# Patient Record
Sex: MALE | Race: WHITE | NOT HISPANIC OR LATINO | Employment: UNEMPLOYED | URBAN - METROPOLITAN AREA
[De-identification: names, ages, dates, MRNs, and addresses within clinical notes are randomized per-mention and may not be internally consistent; named-entity substitution may affect disease eponyms.]

---

## 2017-07-28 ENCOUNTER — HOSPITAL ENCOUNTER (OUTPATIENT)
Dept: LAB | Facility: MEDICAL CENTER | Age: 52
End: 2017-07-28
Attending: FAMILY MEDICINE
Payer: COMMERCIAL

## 2017-07-28 LAB
25(OH)D3 SERPL-MCNC: 23 NG/ML (ref 30–100)
ALBUMIN SERPL BCP-MCNC: 4.3 G/DL (ref 3.2–4.9)
ALBUMIN/GLOB SERPL: 1.7 G/DL
ALP SERPL-CCNC: 83 U/L (ref 30–99)
ALT SERPL-CCNC: 35 U/L (ref 2–50)
ANION GAP SERPL CALC-SCNC: 9 MMOL/L (ref 0–11.9)
APPEARANCE UR: CLEAR
AST SERPL-CCNC: 16 U/L (ref 12–45)
BASOPHILS # BLD AUTO: 0.8 % (ref 0–1.8)
BASOPHILS # BLD: 0.05 K/UL (ref 0–0.12)
BILIRUB SERPL-MCNC: 0.7 MG/DL (ref 0.1–1.5)
BILIRUB UR QL STRIP.AUTO: NEGATIVE
BUN SERPL-MCNC: 19 MG/DL (ref 8–22)
CALCIUM SERPL-MCNC: 9.4 MG/DL (ref 8.5–10.5)
CHLORIDE SERPL-SCNC: 100 MMOL/L (ref 96–112)
CHOLEST SERPL-MCNC: 201 MG/DL (ref 100–199)
CO2 SERPL-SCNC: 27 MMOL/L (ref 20–33)
COLOR UR: YELLOW
CREAT SERPL-MCNC: 0.68 MG/DL (ref 0.5–1.4)
EOSINOPHIL # BLD AUTO: 0.14 K/UL (ref 0–0.51)
EOSINOPHIL NFR BLD: 2.3 % (ref 0–6.9)
ERYTHROCYTE [DISTWIDTH] IN BLOOD BY AUTOMATED COUNT: 44.5 FL (ref 35.9–50)
EST. AVERAGE GLUCOSE BLD GHB EST-MCNC: 255 MG/DL
GFR SERPL CREATININE-BSD FRML MDRD: >60 ML/MIN/1.73 M 2
GLOBULIN SER CALC-MCNC: 2.6 G/DL (ref 1.9–3.5)
GLUCOSE SERPL-MCNC: 210 MG/DL (ref 65–99)
GLUCOSE UR STRIP.AUTO-MCNC: 500 MG/DL
HBA1C MFR BLD: 10.5 % (ref 0–5.6)
HCT VFR BLD AUTO: 50.5 % (ref 42–52)
HDLC SERPL-MCNC: 30 MG/DL
HGB BLD-MCNC: 16.9 G/DL (ref 14–18)
IMM GRANULOCYTES # BLD AUTO: 0.01 K/UL (ref 0–0.11)
IMM GRANULOCYTES NFR BLD AUTO: 0.2 % (ref 0–0.9)
KETONES UR STRIP.AUTO-MCNC: 40 MG/DL
LDLC SERPL CALC-MCNC: ABNORMAL MG/DL
LEUKOCYTE ESTERASE UR QL STRIP.AUTO: NEGATIVE
LYMPHOCYTES # BLD AUTO: 1.76 K/UL (ref 1–4.8)
LYMPHOCYTES NFR BLD: 28.6 % (ref 22–41)
MCH RBC QN AUTO: 29.3 PG (ref 27–33)
MCHC RBC AUTO-ENTMCNC: 33.5 G/DL (ref 33.7–35.3)
MCV RBC AUTO: 87.5 FL (ref 81.4–97.8)
MICRO URNS: ABNORMAL
MONOCYTES # BLD AUTO: 0.62 K/UL (ref 0–0.85)
MONOCYTES NFR BLD AUTO: 10.1 % (ref 0–13.4)
NEUTROPHILS # BLD AUTO: 3.57 K/UL (ref 1.82–7.42)
NEUTROPHILS NFR BLD: 58 % (ref 44–72)
NITRITE UR QL STRIP.AUTO: NEGATIVE
NRBC # BLD AUTO: 0 K/UL
NRBC BLD AUTO-RTO: 0 /100 WBC
PH UR STRIP.AUTO: 6.5 [PH]
PLATELET # BLD AUTO: 179 K/UL (ref 164–446)
PMV BLD AUTO: 11.9 FL (ref 9–12.9)
POTASSIUM SERPL-SCNC: 3.9 MMOL/L (ref 3.6–5.5)
PROT SERPL-MCNC: 6.9 G/DL (ref 6–8.2)
PROT UR QL STRIP: NEGATIVE MG/DL
PSA SERPL-MCNC: 0.43 NG/ML (ref 0–4)
RBC # BLD AUTO: 5.77 M/UL (ref 4.7–6.1)
RBC UR QL AUTO: NEGATIVE
SODIUM SERPL-SCNC: 136 MMOL/L (ref 135–145)
SP GR UR STRIP.AUTO: 1.03
TRIGL SERPL-MCNC: 430 MG/DL (ref 0–149)
UROBILINOGEN UR STRIP.AUTO-MCNC: 0.2 MG/DL
WBC # BLD AUTO: 6.2 K/UL (ref 4.8–10.8)

## 2017-07-28 PROCEDURE — 82306 VITAMIN D 25 HYDROXY: CPT

## 2017-07-28 PROCEDURE — 81003 URINALYSIS AUTO W/O SCOPE: CPT

## 2017-07-28 PROCEDURE — 83036 HEMOGLOBIN GLYCOSYLATED A1C: CPT

## 2017-07-28 PROCEDURE — 36415 COLL VENOUS BLD VENIPUNCTURE: CPT

## 2017-07-28 PROCEDURE — 80053 COMPREHEN METABOLIC PANEL: CPT

## 2017-07-28 PROCEDURE — 85025 COMPLETE CBC W/AUTO DIFF WBC: CPT

## 2017-07-28 PROCEDURE — 84153 ASSAY OF PSA TOTAL: CPT

## 2017-07-28 PROCEDURE — 80061 LIPID PANEL: CPT

## 2017-08-22 ENCOUNTER — OFFICE VISIT (OUTPATIENT)
Dept: ENDOCRINOLOGY | Facility: MEDICAL CENTER | Age: 52
End: 2017-08-22
Payer: COMMERCIAL

## 2017-08-22 VITALS
OXYGEN SATURATION: 99 % | WEIGHT: 232 LBS | HEIGHT: 69 IN | DIASTOLIC BLOOD PRESSURE: 88 MMHG | SYSTOLIC BLOOD PRESSURE: 140 MMHG | BODY MASS INDEX: 34.36 KG/M2 | HEART RATE: 107 BPM

## 2017-08-22 DIAGNOSIS — Z79.4 ENCOUNTER FOR LONG-TERM (CURRENT) USE OF INSULIN (HCC): ICD-10-CM

## 2017-08-22 DIAGNOSIS — E11.00 UNCONTROLLED TYPE 2 DIABETES MELLITUS WITH HYPEROSMOLARITY WITHOUT COMA, UNSPECIFIED LONG TERM INSULIN USE STATUS: ICD-10-CM

## 2017-08-22 PROCEDURE — 99204 OFFICE O/P NEW MOD 45 MIN: CPT | Performed by: PHYSICIAN ASSISTANT

## 2017-08-22 RX ORDER — PIOGLITAZONEHYDROCHLORIDE 30 MG/1
30 TABLET ORAL DAILY
Qty: 30 TAB | Refills: 11 | Status: SHIPPED | OUTPATIENT
Start: 2017-08-22 | End: 2017-10-17 | Stop reason: SDUPTHER

## 2017-08-22 RX ORDER — MULTIVITAMIN WITH IRON
TABLET ORAL
COMMUNITY

## 2017-08-22 RX ORDER — CHLORAL HYDRATE 500 MG
1000 CAPSULE ORAL
COMMUNITY

## 2017-08-22 NOTE — PATIENT INSTRUCTIONS
STOP:  1.  Janumet 50/1000 BID  2,  Lantus (not on)  3.  Humalog Kwikpen (not on)  4.  Jardiance 25    START:  1.   Trulicity 0.75 one a week on 9/5/17 increase TO 1.5  2.   Synjardy 12.5/1000 one in AM one in the PM  3.   Actos 30 one a day ( at pharmacy)

## 2017-08-22 NOTE — PROGRESS NOTES
New Patient Consult Note  Referred by: Jake Constantino M.D.    Reason for consult: Diabetes Management Type 2    HPI:  Salas Cabral is a 51 y.o. old patient who is seeing us today for diabetes care.  This is a pleasant patient with diabetes and I appreciate the opportunity to participate in the care of this patient.  This is a new patient with me today.    Labs of 7/28/17  HbA1c is 10.5, GFR >60, Trig 430,   HbA1c of 12.5 on 3/2015  HbA1c of 11.0 on 6/2012    Previous patient of Fidelia    BG Diary:8/22/2017  In the AM:  Did not bring    Has been Diabetic since  Has a Glucagon pen at home: no    1. Uncontrolled type 2 diabetes mellitus with hyperosmolarity without coma, unspecified long term insulin use status (CMS-HCC)  This is a new patient on 8/22/17  He is on:  1.  Janumet 50/1000 BID  2,  Lantus (not on)  3.  Humalog Kwikpen (not on)  4.  Jardiance 25    STOP:  1.  Janumet 50/1000 BID  2,  Lantus (not on)  3.  Humalog Kwikpen (not on)  4.  Jardiance 25    START:  1.   Trulicity 0.75 one a week on 9/5/17 increase TO 1.5  2.   Synjardy 12.5/1000 one in AM one in the PM  3.   Actos 30 one a day ( at pharmacy)       2. Encounter for long-term (current) use of insulin (CMS-HCC)    Off all insulin right now      ROS:   Constitutional: No change in weight , No fatigue, No night sweats.  HEENT: No Headache.  Eyes:  No blurred vision, No visual changes.  Cardiac: No chest pain, No palpitations.  Resp: No shortness of breath, No cough,   Gastro: No nausea or vomiting, No diarrhea.  Neuro: Denies numbness or tinging in bilateral feet or hands, and no loss of sensation.  Endo: No heat or cold intolerance.  : No polyuria, No polydipsia, No chronic UTI's.  Lower extremities: No lower leg edema bilateral.  All other systems were reviewed and were negative.    Past Medical History:  Patient Active Problem List    Diagnosis Date Noted   • Encounter for long-term (current) use of insulin (CMS-HCC) 08/22/2017    • Obesity, Class II, BMI 35-39.9, with comorbidity (CMS-HCC) 12/06/2013   • Vitamin d deficiency 02/12/2013   • Diabetes mellitus type 2, uncontrolled (CMS-HCC) 05/04/2012   • Hypertension 05/04/2012   • Hyperlipidemia 05/04/2012   • ADHD (attention deficit hyperactivity disorder) 05/04/2012   • ED (erectile dysfunction) 05/04/2012   • Depression 05/04/2012   • Chronic neck pain 05/04/2012   • Migraine headache 05/04/2012       Past Surgical History:  Past Surgical History   Procedure Laterality Date   • Arthroscopy, knee       menis tear bilateral   • Tonsillectomy         Allergies:  Review of patient's allergies indicates no known allergies.    Social History:  Social History     Social History   • Marital Status:      Spouse Name: N/A   • Number of Children: N/A   • Years of Education: N/A     Occupational History   • Not on file.     Social History Main Topics   • Smoking status: Never Smoker    • Smokeless tobacco: Never Used   • Alcohol Use: Yes      Comment: occasional   • Drug Use: No      Comment: works as an    • Sexual Activity: Not on file     Other Topics Concern   • Not on file     Social History Narrative   • No narrative on file       Family History:  Family History   Problem Relation Age of Onset   • Hypertension Mother    • Cancer Mother    • Heart Attack Father 65     cad   • Cancer Father      neck tumor ?   • Diabetes Neg Hx        Medications:    Current outpatient prescriptions:   •  Magnesium 250 MG Tab, Take  by mouth., Disp: , Rfl:   •  Omega-3 Fatty Acids (FISH OIL) 1000 MG Cap capsule, Take 1,000 mg by mouth 3 times a day, with meals., Disp: , Rfl:   •  Potassium (POTASSIMIN PO), Take  by mouth., Disp: , Rfl:   •  pioglitazone (ACTOS) 30 MG Tab, Take 1 Tab by mouth every day., Disp: 30 Tab, Rfl: 11  •  rizatriptan (MAXALT) 5 MG tablet, TAKE 1 TABLET BY MOUTH ONCE AS NEEDED FOR MIGRAINE FOR 1 DOSE MAY REPEAT IN 2 HRS IF NEEDED, Disp: 10 Tab, Rfl: 1  •  duloxetine  (CYMBALTA) 60 MG CPEP, TAKE ONE CAPSULE BY MOUTH EVERY DAY, Disp: 30 Cap, Rfl: 5  •  hydrocodone-acetaminophen (VICODIN ES) 7.5-750 MG per tablet, Take 1 Tab by mouth 2 times a day as needed for Mild Pain., Disp: 60 Each, Rfl: 0  •  Blood Glucose Monitoring Suppl SUPPLIES MISC, Test strips order: Test strips for One Touch Ultra 2 meter. Sig: use 4 and prn ssx high or low sugar, Disp: 100 Each, Rfl: 3  •  glucose blood (FREESTYLE TEST STRIPS) strip, Testing 3 times a day or prn , Disp: 30 Strip, Rfl: 0  •  ibuprofen (MOTRIN) 600 MG TABS, Take 1 Tab by mouth every 8 hours as needed for Mild Pain. AS NEEDED FOR MILD PAIN, Disp: 90 Tab, Rfl: 0  •  ondansetron (ZOFRAN) 4 MG TABS, Take 1 Tab by mouth every 8 hours as needed for Nausea/Vomiting., Disp: 20 Tab, Rfl: 1  •  sildenafil citrate (VIAGRA) 100 MG tablet, Take 1 Tab by mouth as needed for Erectile Dysfunction., Disp: 10 Tab, Rfl: 5  •  azithromycin (ZITHROMAX) 250 MG TABS, 2 tabs by mouth day 1, 1 tab by mouth days 2-5, Disp: 6 Tab, Rfl: 0  •  ofloxacin otic sol (FLOXIN OTIC) 0.3 % SOLN, Place 5 Drops in right ear 2 times a day. Use for 7 days., Disp: 5 mL, Rfl: 0  •  tramadol (ULTRAM) 50 MG TABS, TAKE 1 TO 2 TABLETS BY MOUTH EVERY 8 HOURS AS NEEDED FOR MILD PAIN, Disp: 60 Tab, Rfl: 0  •  hydrocodone-acetaminophen (VICODIN) 5-500 MG TABS, TAKE 1 TABLET BY MOUTH TWICE A DAY AS NEEDED, Disp: 60 Each, Rfl: 0  •  cyclobenzaprine (FLEXERIL) 5 MG tablet, TAKE 1 TABLET BY MOUTH TWICE A DAY AS NEEDED FOR MUSCLE SPASMS, Disp: 60 Tab, Rfl: 0  •  Ramipril (ALTACE) 10 MG TABS, Take 1 Tab by mouth every day., Disp: 90 Tab, Rfl: 3  •  topiramate (TOPAMAX) 25 MG capsule, Take 1 Cap by mouth every day., Disp: 60 Cap, Rfl: 0  •  fexofenadine (ALLEGRA) 180 MG tablet, Take 1 Tab by mouth every day., Disp: 90 Tab, Rfl: 1  •  atorvastatin (LIPITOR) 20 MG TABS, Take 1 Tab by mouth every day., Disp: 90 Each, Rfl: 3      Physical Examination:   Vital signs: /88 mmHg  Pulse  "107  Ht 1.765 m (5' 9.49\")  Wt 105.235 kg (232 lb)  BMI 33.78 kg/m2  SpO2 99%  General: No distress, cooperative, well dressed and well nourished.   Eyes: No scleral icterus or discharge, No hyposphagma  ENMT: Normal on external inspection of nose, lips, No nasal drainage   Neck: No abnormal masses on inspection  Resp: Normal effort, Bilateral clear to auscultation, No wheezing, No rales  CVS: Regular rate and rhythm, S1 S2 normal, No murmur. No gallop  Extremities: No edema bilateral extremities  Neuro: Alert and oriented  Skin: No rash, No Ulcers  Psych: Normal mood and affect    Assessment and Plan:    1. Uncontrolled type 2 diabetes mellitus with hyperosmolarity without coma, unspecified long term insulin use status (CMS-HCC)  This is a new patient on 8/22/17  He is on:  1.  Janumet 50/1000 BID  2,  Lantus (not on)  3.  Humalog Kwikpen (not on)  4.  Jardiance 25    STOP:  1.  Janumet 50/1000 BID  2,  Lantus (not on)  3.  Humalog Kwikpen (not on)  4.  Jardiance 25    START:  1.   Trulicity 0.75 one a week on 9/5/17 increase TO 1.5  2.   Synjardy 12.5/1000 one in AM one in the PM  3.   Actos 30 one a day       2. Encounter for long-term (current) use of insulin (CMS-HCC)  Discussed food at length today      Return in about 2 weeks (around 9/5/2017).    Blood glucose log: Check BG in the morning when wake up, before lunch or dinner and before bed.  So three times a day.  Always bring BG diary to the next office visit.     This patient during there office visit was started on new medication Trulicity and Actos.  Side effects of new medications were discussed with the patient today in the office. The patient was supplied paperwork on this new medication.    Thank you kindly for allowing me to participate in the diabetes care plan for this patient.    Pk Mendoza PA-C, BC-ADM  Board Certified - [employer]  08/22/2017    CC:   Jake Constantino M.D.      "

## 2017-08-22 NOTE — MR AVS SNAPSHOT
"        Salas Cabral   2017 11:30 AM   Office Visit   MRN: [id number]    Department:  Endocrinology [facility]   Dept Phone:  [phone]    Description:  Male : 1965   Provider:  Jackson Mendoza PA-C           Reason for Visit     New Patient           Allergies as of 2017     No Known Allergies      You were diagnosed with     Uncontrolled type 2 diabetes mellitus with hyperosmolarity without coma, unspecified long term insulin use status (CMS-HCC)   [5128798]       Encounter for long-term (current) use of insulin (CMS-HCC)   [V58.67.ICD-9-CM]         Vital Signs     Blood Pressure Pulse Height Weight Body Mass Index Oxygen Saturation    140/88 mmHg 107 1.765 m (5' 9.49\") 105.235 kg (232 lb) 33.78 kg/m2 99%    Smoking Status                   Never Smoker            Basic Information     Date Of Birth Sex Race Ethnicity Preferred Language    1965 Male White Non- English      Your appointments     Sep 06, 2017 11:50 AM   Established Patient with Jcakson Mendoza PA-C   [facility]    [address], [address]  [facility] [address] [phone]           You will be receiving a confirmation call a few days before your appointment from our automated call confirmation system.              Problem List              ICD-10-CM Priority Class Noted - Resolved    Diabetes mellitus type 2, uncontrolled (CMS-HCC) E11.65   2012 - Present    Hypertension (Chronic) I10   2012 - Present    Hyperlipidemia (Chronic) E78.5   2012 - Present    ADHD (attention deficit hyperactivity disorder) F90.9   2012 - Present    ED (erectile dysfunction) N52.9   2012 - Present    Depression F32.9   2012 - Present    Chronic neck pain M54.2, G89.29   2012 - Present    Migraine headache G43.909   2012 - Present    Vitamin d deficiency    2013 - Present    Obesity, Class II, BMI 35-39.9, with comorbidity (CMS-HCC) E66.9   " 12/6/2013 - Present    Encounter for long-term (current) use of insulin (CMS-[facility]) Z79.4   8/22/2017 - Present      Health Maintenance        Date Due Completion Dates    IMM HEP B VACCINE (1 of 3 - Primary Series) 1965 ---    IMM PNEUMOCOCCAL 19-64 (ADULT) MEDIUM RISK SERIES (1 of 1 - PPSV23) 1/31/2014 12/6/2013    RETINAL SCREENING 11/6/2014 11/6/2013 (Prv Comp)    Override on 11/6/2013: Previously completed (Peri Leger)    URINE ACR / MICROALBUMIN 11/27/2014 11/27/2013, 5/31/2012    DIABETES MONOFILAMENT / LE EXAM 12/6/2014 12/6/2013    COLONOSCOPY 11/20/2016 ---    IMM INFLUENZA (1) 9/1/2017 12/6/2013    A1C SCREENING 1/28/2018 7/28/2017, 12/21/2015, 3/27/2015, 11/27/2013, 1/30/2013, 10/9/2012, 5/31/2012    FASTING LIPID PROFILE 7/28/2018 7/28/2017, 12/21/2015, 3/27/2015, 11/27/2013, 1/31/2013, 10/9/2012, 5/31/2012    SERUM CREATININE 7/28/2018 7/28/2017, 12/21/2015, 3/27/2015, 11/27/2013, 1/30/2013, 10/9/2012, 5/31/2012    IMM DTaP/Tdap/Td Vaccine (2 - Td) 12/6/2023 12/6/2013            Current Immunizations     13-VALENT PCV PREVNAR 12/6/2013    Influenza TIV (IM) 12/6/2013    Tdap Vaccine 12/6/2013      Below and/or attached are the medications your provider expects you to take. Review all of your home medications and newly ordered medications with your provider and/or pharmacist. Follow medication instructions as directed by your provider and/or pharmacist. Please keep your medication list with you and share with your provider. Update the information when medications are discontinued, doses are changed, or new medications (including over-the-counter products) are added; and carry medication information at all times in the event of emergency situations     Allergies:  No Known Allergies          Medications  Valid as of: August 22, 2017 - 11:48 AM    Generic Name Brand Name Tablet Size Instructions for use    Atorvastatin Calcium (Tab) LIPITOR 20 MG Take 1 Tab by mouth every day.        Azithromycin  (Tab) ZITHROMAX 250 MG 2 tabs by mouth day 1, 1 tab by mouth days 2-5        Blood Glucose Monitoring Suppl (Misc) Blood Glucose Monitoring Suppl Supplies Test strips order: Test strips for One Touch Ultra 2 meter. Sig: use 4 and prn ssx high or low sugar        Cyclobenzaprine HCl (Tab) FLEXERIL 5 MG TAKE 1 TABLET BY MOUTH TWICE A DAY AS NEEDED FOR MUSCLE SPASMS        DULoxetine HCl (Cap DR Particles) CYMBALTA 60 MG TAKE ONE CAPSULE BY MOUTH EVERY DAY        Fexofenadine HCl (Tab) ALLEGRA 180 MG Take 1 Tab by mouth every day.        Glucose Blood (Strip) glucose blood  Testing 3 times a day or prn          Hydrocodone-Acetaminophen (Tab) VICODIN ES 7.5-750 MG Take 1 Tab by mouth 2 times a day as needed for Mild Pain.        Hydrocodone-Acetaminophen (Tab) VICODIN 5-500 MG TAKE 1 TABLET BY MOUTH TWICE A DAY AS NEEDED        Ibuprofen (Tab) MOTRIN 600 MG Take 1 Tab by mouth every 8 hours as needed for Mild Pain. AS NEEDED FOR MILD PAIN        Magnesium (Tab) Magnesium 250 MG Take  by mouth.        Ofloxacin (Solution) FLOXIN OTIC 0.3 % Place 5 Drops in right ear 2 times a day. Use for 7 days.        Omega-3 Fatty Acids (Cap) fish oil 1000 MG Take 1,000 mg by mouth 3 times a day, with meals.        Ondansetron HCl (Tab) ZOFRAN 4 MG Take 1 Tab by mouth every 8 hours as needed for Nausea/Vomiting.        Pioglitazone HCl (Tab) ACTOS 30 MG Take 1 Tab by mouth every day.        Potassium   Take  by mouth.        Ramipril (Tab) Ramipril 10 MG Take 1 Tab by mouth every day.        Rizatriptan Benzoate (Tab) MAXALT 5 MG TAKE 1 TABLET BY MOUTH ONCE AS NEEDED FOR MIGRAINE FOR 1 DOSE MAY REPEAT IN 2 HRS IF NEEDED        Sildenafil Citrate (Tab) VIAGRA 100 MG Take 1 Tab by mouth as needed for Erectile Dysfunction.        Topiramate (CAPSULE SPRINKLE) TOPAMAX 25 MG Take 1 Cap by mouth every day.        TraMADol HCl (Tab) ULTRAM 50 MG TAKE 1 TO 2 TABLETS BY MOUTH EVERY 8 HOURS AS NEEDED FOR MILD PAIN        .                  Medicines prescribed today were sent to:     [id number] - [place], [place] - [address] [place] [address]    [address] [place] [facility] [place] [address]    Phone: [phone] Fax: [phone]    Open 24 Hours?: No      Medication refill instructions:       If your prescription bottle indicates you have medication refills left, it is not necessary to call your provider’s office. Please contact your pharmacy and they will refill your medication.    If your prescription bottle indicates you do not have any refills left, you may request refills at any time through one of the following ways: The online [employer] system (except Urgent Care), by calling your provider’s office, or by asking your pharmacy to contact your provider’s office with a refill request. Medication refills are processed only during regular business hours and may not be available until the next business day. Your provider may request additional information or to have a follow-up visit with you prior to refilling your medication.   *Please Note: Medication refills are assigned a new Rx number when refilled electronically. Your pharmacy may indicate that no refills were authorized even though a new prescription for the same medication is available at the pharmacy. Please request the medicine by name with the pharmacy before contacting your provider for a refill.        Instructions    STOP:  1.  Janumet 50/1000 BID  2,  Lantus (not on)  3.  Humalog Kwikpen (not on)  4.  Jardiance 25    START:  1.   Trulicity 0.75 one a week on 9/5/17 increase TO 1.5  2.   Synjardy 12.5/1000 one in AM one in the PM  3.   Actos 30 one a day ( at pharmacy)                 [employer] Access Code: Activation code not generated  Current [employer] Status: Active

## 2017-09-06 ENCOUNTER — OFFICE VISIT (OUTPATIENT)
Dept: ENDOCRINOLOGY | Facility: MEDICAL CENTER | Age: 52
End: 2017-09-06
Payer: COMMERCIAL

## 2017-09-06 VITALS
OXYGEN SATURATION: 93 % | HEART RATE: 106 BPM | BODY MASS INDEX: 33.21 KG/M2 | DIASTOLIC BLOOD PRESSURE: 82 MMHG | HEIGHT: 70 IN | SYSTOLIC BLOOD PRESSURE: 126 MMHG | WEIGHT: 232 LBS

## 2017-09-06 DIAGNOSIS — E11.00 UNCONTROLLED TYPE 2 DIABETES MELLITUS WITH HYPEROSMOLARITY WITHOUT COMA, WITHOUT LONG-TERM CURRENT USE OF INSULIN (HCC): ICD-10-CM

## 2017-09-06 DIAGNOSIS — Z79.4 ENCOUNTER FOR LONG-TERM (CURRENT) USE OF INSULIN (HCC): ICD-10-CM

## 2017-09-06 PROCEDURE — 99214 OFFICE O/P EST MOD 30 MIN: CPT | Performed by: PHYSICIAN ASSISTANT

## 2017-09-06 NOTE — PROGRESS NOTES
Return to office Patient Consult Note  Referred by: Jake Constantino M.D.    Reason for consult: Diabetes Management Type 2    HPI:  Salas Cabral is a 51 y.o. old patient who is seeing us today for diabetes care.  This is a pleasant patient with diabetes and I appreciate the opportunity to participate in the care of this patient.    BG Diary:9/6/2017  In the AM: 118, 139, 154, 156, 140  Before meal: 117, 102, 156   Before Bed: 143, 113, 122, 126     Labs of 7/28/17  HbA1c is 10.5, GFR >60, Trig 430,   HbA1c of 12.5 on 3/2015  HbA1c of 11.0 on 6/2012     Previous patient of Fidelia     BG Diary:8/22/2017  In the AM:  Did not bring      1. Uncontrolled type 2 diabetes mellitus with hyperosmolarity without coma, without long-term current use of insulin (CMS-HCC)  This is a new patient on 8/22/17  He was on:  1.  Janumet 50/1000 BID  2,  Lantus (not on)  3.  Humalog Kwikpen (not on)  4.  Jardiance 25     I had him STOP:  1.  Janumet 50/1000 BID  2,  Lantus (not on)  3.  Humalog Kwikpen (not on)  4.  Jardiance 25     START:  1.   Trulicity 1.5 weekly  2.   Synjardy 12.5/1000 one in AM one in the PM  3.   Actos 30 one a day     2. Encounter for long-term (current) use of insulin (CMS-HCC)  Now off all insulin         ROS:   Constitutional: No night sweats.  Eyes:  No visual changes.  Cardiac: No chest pain, No palpitations or racing heart rate.  Resp: No shortness of breath, No cough,   Gi: No Diarrhea    All other systems were reviewed and were/are negative.  The ROS was revised/revisited during this office visit from the patients first office visit with me on 8/22/17  Please review the full ROS during the first office visit.    Past Medical History:  Patient Active Problem List    Diagnosis Date Noted   • Encounter for long-term (current) use of insulin (CMS-HCC) 08/22/2017   • Obesity, Class II, BMI 35-39.9, with comorbidity (CMS-[facility]) 12/06/2013   • Vitamin d deficiency 02/12/2013   • Diabetes mellitus type 2,  uncontrolled (CMS-HCC) 05/04/2012   • Hypertension 05/04/2012   • Hyperlipidemia 05/04/2012   • ADHD (attention deficit hyperactivity disorder) 05/04/2012   • ED (erectile dysfunction) 05/04/2012   • Depression 05/04/2012   • Chronic neck pain 05/04/2012   • Migraine headache 05/04/2012       Past Surgical History:  Past Surgical History:   Procedure Laterality Date   • ARTHROSCOPY, KNEE      menis tear bilateral   • TONSILLECTOMY         Allergies:  Review of patient's allergies indicates no known allergies.    Social History:  Social History     Social History   • Marital status:      Spouse name: N/A   • Number of children: N/A   • Years of education: N/A     Occupational History   • Not on file.     Social History Main Topics   • Smoking status: Never Smoker   • Smokeless tobacco: Never Used   • Alcohol use Yes      Comment: occasional   • Drug use: No      Comment: works as an    • Sexual activity: Not on file     Other Topics Concern   • Not on file     Social History Narrative   • No narrative on file       Family History:  Family History   Problem Relation Age of Onset   • Hypertension Mother    • Cancer Mother    • Heart Attack Father 65     cad   • Cancer Father      neck tumor ?   • Diabetes Neg Hx        Medications:    Current Outpatient Prescriptions:   •  Empagliflozin-Metformin HCl ER 12.5-1000 MG TABLET SR 24 HR, Take 1 Tab by mouth 2 Times a Day., Disp: 60 Tab, Rfl: 11  •  Dulaglutide (TRULICITY) 1.5 MG/0.5ML Solution Pen-injector, Inject 0.5 mL as instructed every 7 days., Disp: 4 PEN, Rfl: 6  •  Magnesium 250 MG Tab, Take  by mouth., Disp: , Rfl:   •  Omega-3 Fatty Acids (FISH OIL) 1000 MG Cap capsule, Take 1,000 mg by mouth 3 times a day, with meals., Disp: , Rfl:   •  Potassium (POTASSIMIN PO), Take  by mouth., Disp: , Rfl:   •  pioglitazone (ACTOS) 30 MG Tab, Take 1 Tab by mouth every day., Disp: 30 Tab, Rfl: 11  •  duloxetine (CYMBALTA) 60 MG CPEP, TAKE ONE CAPSULE BY MOUTH  EVERY DAY, Disp: 30 Cap, Rfl: 5  •  Blood Glucose Monitoring Suppl SUPPLIES MISC, Test strips order: Test strips for One Touch Ultra 2 meter. Sig: use 4 and prn ssx high or low sugar, Disp: 100 Each, Rfl: 3  •  glucose blood (FREESTYLE TEST STRIPS) strip, Testing 3 times a day or prn , Disp: 30 Strip, Rfl: 0  •  rizatriptan (MAXALT) 5 MG tablet, TAKE 1 TABLET BY MOUTH ONCE AS NEEDED FOR MIGRAINE FOR 1 DOSE MAY REPEAT IN 2 HRS IF NEEDED, Disp: 10 Tab, Rfl: 1  •  azithromycin (ZITHROMAX) 250 MG TABS, 2 tabs by mouth day 1, 1 tab by mouth days 2-5, Disp: 6 Tab, Rfl: 0  •  ofloxacin otic sol (FLOXIN OTIC) 0.3 % SOLN, Place 5 Drops in right ear 2 times a day. Use for 7 days., Disp: 5 mL, Rfl: 0  •  tramadol (ULTRAM) 50 MG TABS, TAKE 1 TO 2 TABLETS BY MOUTH EVERY 8 HOURS AS NEEDED FOR MILD PAIN, Disp: 60 Tab, Rfl: 0  •  hydrocodone-acetaminophen (VICODIN) 5-500 MG TABS, TAKE 1 TABLET BY MOUTH TWICE A DAY AS NEEDED, Disp: 60 Each, Rfl: 0  •  cyclobenzaprine (FLEXERIL) 5 MG tablet, TAKE 1 TABLET BY MOUTH TWICE A DAY AS NEEDED FOR MUSCLE SPASMS, Disp: 60 Tab, Rfl: 0  •  hydrocodone-acetaminophen (VICODIN ES) 7.5-750 MG per tablet, Take 1 Tab by mouth 2 times a day as needed for Mild Pain., Disp: 60 Each, Rfl: 0  •  Ramipril (ALTACE) 10 MG TABS, Take 1 Tab by mouth every day., Disp: 90 Tab, Rfl: 3  •  topiramate (TOPAMAX) 25 MG capsule, Take 1 Cap by mouth every day., Disp: 60 Cap, Rfl: 0  •  ibuprofen (MOTRIN) 600 MG TABS, Take 1 Tab by mouth every 8 hours as needed for Mild Pain. AS NEEDED FOR MILD PAIN, Disp: 90 Tab, Rfl: 0  •  ondansetron (ZOFRAN) 4 MG TABS, Take 1 Tab by mouth every 8 hours as needed for Nausea/Vomiting., Disp: 20 Tab, Rfl: 1  •  fexofenadine (ALLEGRA) 180 MG tablet, Take 1 Tab by mouth every day., Disp: 90 Tab, Rfl: 1  •  atorvastatin (LIPITOR) 20 MG TABS, Take 1 Tab by mouth every day., Disp: 90 Each, Rfl: 3  •  sildenafil citrate (VIAGRA) 100 MG tablet, Take 1 Tab by mouth as needed for  "Erectile Dysfunction., Disp: 10 Tab, Rfl: 5        Physical Examination:   Vital signs: /82   Pulse (!) 106   Ht 1.765 m (5' 9.5\")   Wt 105.2 kg (232 lb)   SpO2 93%   BMI 33.77 kg/m²   General: No distress, cooperative, well dressed and well nourished.   Eyes: No scleral icterus or discharge, No hyposphagma  ENMT: Normal on external inspection of nose, lips, No nasal drainage   Neck: No abnormal masses on inspection  Resp: Normal effort, Bilateral clear to auscultation, No wheezing, No rales  CVS: Regular rate and rhythm, S1 S2 normal, No murmur. No gallop  Extremities: No edema bilateral extremities  Neuro: Alert and oriented  Skin: No rash, No Ulcers  Psych: Normal mood and affect      Assessment and Plan:    1. Uncontrolled type 2 diabetes mellitus with hyperosmolarity without coma, without long-term current use of insulin (CMS-[facility])    Now on:  1.   Trulicity 1.5 each week  2.   Synjardy 12.5/1000 one in AM one in the PM  3.   Actos 30 one a day  (if numbers come back in 2 weeks well I will stop Actos)      2. Encounter for long-term (current) use of insulin (CMS-[facility])  Now off all insulin and he states his numbers have never looked so good      Return in about 2 weeks (around 9/20/2017).    Blood glucose log: Check BG in the morning when wake up, before lunch or dinner and before bed.  So three times a day.  Always bring BG diary to the next office visit.     Thank you kindly for allowing me to participate in the diabetes care plan for this patient.    Pk Mendoza PA-C, BC-ADM  Board Certified - Advanced Diabetes Management  09/06/17    CC:   Jake Constantino M.D.    "

## 2017-09-20 ENCOUNTER — APPOINTMENT (OUTPATIENT)
Dept: ENDOCRINOLOGY | Facility: MEDICAL CENTER | Age: 52
End: 2017-09-20
Payer: COMMERCIAL

## 2017-09-29 ENCOUNTER — OFFICE VISIT (OUTPATIENT)
Dept: ENDOCRINOLOGY | Facility: MEDICAL CENTER | Age: 52
End: 2017-09-29
Payer: COMMERCIAL

## 2017-09-29 VITALS
BODY MASS INDEX: 32.21 KG/M2 | OXYGEN SATURATION: 94 % | HEIGHT: 70 IN | SYSTOLIC BLOOD PRESSURE: 134 MMHG | WEIGHT: 225 LBS | HEART RATE: 137 BPM | DIASTOLIC BLOOD PRESSURE: 86 MMHG

## 2017-09-29 DIAGNOSIS — E11.00 UNCONTROLLED TYPE 2 DIABETES MELLITUS WITH HYPEROSMOLARITY WITHOUT COMA, WITHOUT LONG-TERM CURRENT USE OF INSULIN (HCC): ICD-10-CM

## 2017-09-29 DIAGNOSIS — Z79.4 ENCOUNTER FOR LONG-TERM (CURRENT) USE OF INSULIN (HCC): ICD-10-CM

## 2017-09-29 PROCEDURE — 99213 OFFICE O/P EST LOW 20 MIN: CPT | Performed by: PHYSICIAN ASSISTANT

## 2017-09-29 NOTE — PROGRESS NOTES
Return to office Patient Consult Note  Referred by: Jake Constantino M.D.    Reason for consult: Diabetes Management Type 2    HPI:  Salas Cabral is a 51 y.o. old patient who is seeing us today for diabetes care.  This is a pleasant patient with diabetes and I appreciate the opportunity to participate in the care of this patient.    BG Diary:9/29/2017  In the AM: 169, 147, 150, 180, 163, 138, 154, 234  Before meal:  Before Bed: 140, 160, 172, 140, 120, 268     BG Diary:9/6/2017  In the AM: 118, 139, 154, 156, 140  Before meal: 117, 102, 156   Before Bed: 143, 113, 122, 126      Labs of 7/28/17  HbA1c is 10.5, GFR >60, Trig 430,   HbA1c of 12.5 on 3/2015  HbA1c of 11.0 on 6/2012     Previous patient of Fidelia     BG Diary:8/22/2017  In the AM:  Did not bring         1. Uncontrolled type 2 diabetes mellitus with hyperosmolarity without coma, without long-term current use of insulin (CMS-[facility])  This is a new patient on 8/22/17  He was on:  1.  Janumet 50/1000 BID  2,  Lantus (not on)  3.  Humalog Kwikpen (not on)  4.  Jardiance 25     I had him STOP:  1.  Janumet 50/1000 BID  2,  Lantus (not on)  3.  Humalog Kwikpen (not on)  4.  Jardiance 25     Now on:  1.   Trulicity 1.5 each week (Sunday)  2.   Synjardy 12.5/1000 one in AM one in the PM  3.   Actos 30 one a day  (if numbers come back in 2 weeks well I will stop Actos)    2. Encounter for long-term (current) use of insulin (CMS-[facility])  Now off all insulin      ROS:   Constitutional: No night sweats.  Eyes:  No visual changes.  Cardiac: No chest pain, No palpitations or racing heart rate.  Resp: No shortness of breath, No cough,   Gi: No Diarrhea    All other systems were reviewed and were/are negative.  The ROS was revised/revisited during this office visit from the patients first office visit with me on 8/22/17  Please review the full ROS during the first office visit.    Past Medical History:  Patient Active Problem List    Diagnosis Date Noted   •  Encounter for long-term (current) use of insulin (CMS-HCC) 08/22/2017   • Obesity, Class II, BMI 35-39.9, with comorbidity 12/06/2013   • Vitamin d deficiency 02/12/2013   • Diabetes mellitus type 2, uncontrolled (CMS-HCC) 05/04/2012   • Hypertension 05/04/2012   • Hyperlipidemia 05/04/2012   • ADHD (attention deficit hyperactivity disorder) 05/04/2012   • ED (erectile dysfunction) 05/04/2012   • Depression 05/04/2012   • Chronic neck pain 05/04/2012   • Migraine headache 05/04/2012       Past Surgical History:  Past Surgical History:   Procedure Laterality Date   • ARTHROSCOPY, KNEE      menis tear bilateral   • TONSILLECTOMY         Allergies:  Review of patient's allergies indicates no known allergies.    Social History:  Social History     Social History   • Marital status:      Spouse name: N/A   • Number of children: N/A   • Years of education: N/A     Occupational History   • Not on file.     Social History Main Topics   • Smoking status: Never Smoker   • Smokeless tobacco: Never Used   • Alcohol use Yes      Comment: occasional   • Drug use: No      Comment: works as an    • Sexual activity: Not on file     Other Topics Concern   • Not on file     Social History Narrative   • No narrative on file       Family History:  Family History   Problem Relation Age of Onset   • Hypertension Mother    • Cancer Mother    • Heart Attack Father 65     cad   • Cancer Father      neck tumor ?   • Diabetes Neg Hx        Medications:    Current Outpatient Prescriptions:   •  Dulaglutide (TRULICITY) 1.5 MG/0.5ML Solution Pen-injector, Inject 0.5 mL as instructed every 7 days., Disp: 4 PEN, Rfl: 6  •  Empagliflozin-Metformin HCl ER 12.5-1000 MG TABLET SR 24 HR, Take 1 Tab by mouth 2 Times a Day., Disp: 60 Tab, Rfl: 11  •  Magnesium 250 MG Tab, Take  by mouth., Disp: , Rfl:   •  Omega-3 Fatty Acids (FISH OIL) 1000 MG Cap capsule, Take 1,000 mg by mouth 3 times a day, with meals., Disp: , Rfl:   •  Potassium  (POTASSIMIN PO), Take  by mouth., Disp: , Rfl:   •  pioglitazone (ACTOS) 30 MG Tab, Take 1 Tab by mouth every day., Disp: 30 Tab, Rfl: 11  •  Blood Glucose Monitoring Suppl SUPPLIES MISC, Test strips order: Test strips for One Touch Ultra 2 meter. Sig: use 4 and prn ssx high or low sugar, Disp: 100 Each, Rfl: 3  •  glucose blood (FREESTYLE TEST STRIPS) strip, Testing 3 times a day or prn , Disp: 30 Strip, Rfl: 0  •  rizatriptan (MAXALT) 5 MG tablet, TAKE 1 TABLET BY MOUTH ONCE AS NEEDED FOR MIGRAINE FOR 1 DOSE MAY REPEAT IN 2 HRS IF NEEDED, Disp: 10 Tab, Rfl: 1  •  azithromycin (ZITHROMAX) 250 MG TABS, 2 tabs by mouth day 1, 1 tab by mouth days 2-5, Disp: 6 Tab, Rfl: 0  •  ofloxacin otic sol (FLOXIN OTIC) 0.3 % SOLN, Place 5 Drops in right ear 2 times a day. Use for 7 days., Disp: 5 mL, Rfl: 0  •  tramadol (ULTRAM) 50 MG TABS, TAKE 1 TO 2 TABLETS BY MOUTH EVERY 8 HOURS AS NEEDED FOR MILD PAIN, Disp: 60 Tab, Rfl: 0  •  duloxetine (CYMBALTA) 60 MG CPEP, TAKE ONE CAPSULE BY MOUTH EVERY DAY, Disp: 30 Cap, Rfl: 5  •  hydrocodone-acetaminophen (VICODIN) 5-500 MG TABS, TAKE 1 TABLET BY MOUTH TWICE A DAY AS NEEDED, Disp: 60 Each, Rfl: 0  •  cyclobenzaprine (FLEXERIL) 5 MG tablet, TAKE 1 TABLET BY MOUTH TWICE A DAY AS NEEDED FOR MUSCLE SPASMS, Disp: 60 Tab, Rfl: 0  •  hydrocodone-acetaminophen (VICODIN ES) 7.5-750 MG per tablet, Take 1 Tab by mouth 2 times a day as needed for Mild Pain., Disp: 60 Each, Rfl: 0  •  Ramipril (ALTACE) 10 MG TABS, Take 1 Tab by mouth every day., Disp: 90 Tab, Rfl: 3  •  topiramate (TOPAMAX) 25 MG capsule, Take 1 Cap by mouth every day., Disp: 60 Cap, Rfl: 0  •  ibuprofen (MOTRIN) 600 MG TABS, Take 1 Tab by mouth every 8 hours as needed for Mild Pain. AS NEEDED FOR MILD PAIN, Disp: 90 Tab, Rfl: 0  •  ondansetron (ZOFRAN) 4 MG TABS, Take 1 Tab by mouth every 8 hours as needed for Nausea/Vomiting., Disp: 20 Tab, Rfl: 1  •  fexofenadine (ALLEGRA) 180 MG tablet, Take 1 Tab by mouth every day.,  "Disp: 90 Tab, Rfl: 1  •  atorvastatin (LIPITOR) 20 MG TABS, Take 1 Tab by mouth every day., Disp: 90 Each, Rfl: 3  •  sildenafil citrate (VIAGRA) 100 MG tablet, Take 1 Tab by mouth as needed for Erectile Dysfunction., Disp: 10 Tab, Rfl: 5        Physical Examination:   Vital signs: /86   Pulse (!) 137   Ht 1.765 m (5' 9.5\")   Wt 102.1 kg (225 lb)   SpO2 94%   BMI 32.75 kg/m²   General: No distress, cooperative, well dressed and well nourished.   Eyes: No scleral icterus or discharge, No hyposphagma  ENMT: Normal on external inspection of nose, lips, No nasal drainage   Neck: No abnormal masses on inspection  Resp: Normal effort, Bilateral clear to auscultation, No wheezing, No rales  CVS: Regular rate and rhythm, S1 S2 normal, No murmur. No gallop  Extremities: No edema bilateral extremities  Neuro: Alert and oriented  Skin: No rash, No Ulcers  Psych: Normal mood and affect      Assessment and Plan:    1. Uncontrolled type 2 diabetes mellitus with hyperosmolarity without coma, without long-term current use of insulin (CMS-[facility])    Now on:  1.   Trulicity 1.5 each week  2.   Synjardy 12.5/1000 one in AM one in the PM  3.   Actos 30 one a day       2. Encounter for long-term (current) use of insulin (CMS-HCC)  Off insulin for now      Return in about 1 month (around 10/29/2017).    Blood glucose log: Check BG in the morning when wake up, before lunch or dinner and before bed.  So three times a day.  Always bring BG diary to the next office visit.     Thank you kindly for allowing me to participate in the diabetes care plan for this patient.    Pk Mendoza PA-C, BC-ADM  Board Certified - Advanced Diabetes Management  09/29/17    CC:   Jake Constantino M.D.    "

## 2017-10-09 NOTE — TELEPHONE ENCOUNTER
Called insurance. Per insurance for next month [facility] pt will need a 90 day supply in order for it to be covered.

## 2017-10-10 ENCOUNTER — TELEPHONE (OUTPATIENT)
Dept: ENDOCRINOLOGY | Facility: MEDICAL CENTER | Age: 52
End: 2017-10-10

## 2017-10-10 NOTE — TELEPHONE ENCOUNTER
Pt states he called insurance and they advised to let Pk know to resend Rx for Trulicity but to be more specific on the prescription, if pt needs 90 day supply to put pens by week, then pt will only get 84 day supply. If giving a month supply specify 4 pens for 4 weeks.    Also pt insurance doesn't cover Synjardy, but Xigduo is a covered medication. Please send to pharmacy.  Thank you

## 2017-10-11 RX ORDER — DAPAGLIFLOZIN AND METFORMIN HYDROCHLORIDE 5; 1000 MG/1; MG/1
1 TABLET, FILM COATED, EXTENDED RELEASE ORAL 2 TIMES DAILY
Qty: 60 TAB | Refills: 11 | Status: SHIPPED | OUTPATIENT
Start: 2017-10-11 | End: 2017-12-29 | Stop reason: SDUPTHER

## 2017-10-17 RX ORDER — PIOGLITAZONEHYDROCHLORIDE 30 MG/1
30 TABLET ORAL DAILY
Qty: 90 TAB | Refills: 4 | Status: SHIPPED | OUTPATIENT
Start: 2017-10-17 | End: 2018-10-29 | Stop reason: SDUPTHER

## 2017-11-03 ENCOUNTER — APPOINTMENT (OUTPATIENT)
Dept: ENDOCRINOLOGY | Facility: MEDICAL CENTER | Age: 52
End: 2017-11-03
Payer: COMMERCIAL

## 2017-12-29 RX ORDER — DAPAGLIFLOZIN AND METFORMIN HYDROCHLORIDE 5; 1000 MG/1; MG/1
1 TABLET, FILM COATED, EXTENDED RELEASE ORAL 2 TIMES DAILY
Qty: 180 TAB | Refills: 3 | Status: SHIPPED | OUTPATIENT
Start: 2017-12-29

## 2018-06-06 ENCOUNTER — TELEPHONE (OUTPATIENT)
Dept: ENDOCRINOLOGY | Facility: MEDICAL CENTER | Age: 53
End: 2018-06-06

## 2018-06-11 ENCOUNTER — TELEPHONE (OUTPATIENT)
Dept: ENDOCRINOLOGY | Facility: MEDICAL CENTER | Age: 53
End: 2018-06-11

## 2018-09-25 ENCOUNTER — TELEPHONE (OUTPATIENT)
Dept: ENDOCRINOLOGY | Facility: MEDICAL CENTER | Age: 53
End: 2018-09-25

## 2018-09-25 NOTE — TELEPHONE ENCOUNTER
PA on Actos was initiated on CoverMy Meds.    Your request has been approved   CaseId:[id number];Status:Approved;Review Type:Prior Auth;Coverage Start Date:09/25/2018;Coverage End Date:09/25/2019;

## 2018-10-29 RX ORDER — PIOGLITAZONEHYDROCHLORIDE 30 MG/1
TABLET ORAL
Qty: 90 TAB | Refills: 1 | Status: SHIPPED | OUTPATIENT
Start: 2018-10-29

## 2018-10-29 NOTE — TELEPHONE ENCOUNTER
Was the patient seen in the last year in this department? Yes    Does patient have an active prescription for medications requested? No     Received Request Via: Pharmacy     pioglitazone (ACTOS) 30 MG Tab  TAKE 1 TABLET BY MOUTH EVERY DAY

## 2019-03-29 ENCOUNTER — OFFICE VISIT (OUTPATIENT)
Dept: URGENT CARE | Facility: PHYSICIAN GROUP | Age: 54
End: 2019-03-29
Payer: COMMERCIAL

## 2019-03-29 VITALS
WEIGHT: 240 LBS | BODY MASS INDEX: 34.36 KG/M2 | OXYGEN SATURATION: 93 % | SYSTOLIC BLOOD PRESSURE: 142 MMHG | HEIGHT: 70 IN | DIASTOLIC BLOOD PRESSURE: 88 MMHG | TEMPERATURE: 97.8 F | HEART RATE: 100 BPM

## 2019-03-29 DIAGNOSIS — R09.82 PND (POST-NASAL DRIP): ICD-10-CM

## 2019-03-29 DIAGNOSIS — R09.81 NASAL SINUS CONGESTION: ICD-10-CM

## 2019-03-29 DIAGNOSIS — H69.91 EUSTACHIAN TUBE DYSFUNCTION, RIGHT: ICD-10-CM

## 2019-03-29 DIAGNOSIS — H92.01 ACUTE PAIN OF RIGHT EAR: ICD-10-CM

## 2019-03-29 PROCEDURE — 99213 OFFICE O/P EST LOW 20 MIN: CPT | Performed by: NURSE PRACTITIONER

## 2019-03-29 RX ORDER — EMPAGLIFLOZIN 10 MG/1
1 TABLET, FILM COATED ORAL
Refills: 3 | COMMUNITY
Start: 2019-03-07

## 2019-03-29 ASSESSMENT — ENCOUNTER SYMPTOMS
WEAKNESS: 0
FEVER: 0
EYE DISCHARGE: 0
DIARRHEA: 0
MYALGIAS: 0
CONSTIPATION: 0
EYE REDNESS: 0
VOMITING: 0
DIZZINESS: 0
SHORTNESS OF BREATH: 0
CHILLS: 0
NECK PAIN: 0
SINUS PAIN: 0
ABDOMINAL PAIN: 0
HEADACHES: 0
COUGH: 0
NAUSEA: 0
SORE THROAT: 0
WHEEZING: 0

## 2019-03-29 NOTE — PROGRESS NOTES
"Subjective:      Salas Cabral is a 53 y.o. male who presents with Otalgia (onset 2 weeks// R ear)            HPI  Right ear pain x 2 weeks, had \"Flu\" 2 weeks ago, \"popping\" in ear. Denies fever, Ibuprofen for pain.    PMH:  has a past medical history of ADD (attention deficit disorder with hyperactivity) (5/4/2012); Depression (5/4/2012); Diabetes mellitus type 2, uncontrolled (HCC) (5/4/2012); ED (erectile dysfunction) (5/4/2012); Hyperlipidemia (5/4/2012); and Hypertension (5/4/2012).  MEDS:   Current Outpatient Prescriptions:   •  metformin (GLUCOPHAGE) 1000 MG tablet, TAKE 1 TABLET BY MOUTH TWICE A DAY, Disp: , Rfl: 3  •  JARDIANCE 10 MG Tab, Take 1 Tab by mouth., Disp: , Rfl: 3  •  pioglitazone (ACTOS) 30 MG Tab, TAKE 1 TABLET BY MOUTH EVERY DAY, Disp: 90 Tab, Rfl: 1  •  Dulaglutide (TRULICITY) 1.5 MG/0.5ML Solution Pen-injector, Inject 0.5 mL as instructed every 7 days., Disp: 12 PEN, Rfl: 4  •  Magnesium 250 MG Tab, Take  by mouth., Disp: , Rfl:   •  Omega-3 Fatty Acids (FISH OIL) 1000 MG Cap capsule, Take 1,000 mg by mouth 3 times a day, with meals., Disp: , Rfl:   •  Potassium (POTASSIMIN PO), Take  by mouth., Disp: , Rfl:   •  duloxetine (CYMBALTA) 60 MG CPEP, TAKE ONE CAPSULE BY MOUTH EVERY DAY, Disp: 30 Cap, Rfl: 5  •  Dapagliflozin-Metformin HCl ER 5-1000 MG TABLET SR 24 HR, Take 1 tablet by mouth 2 Times a Day. (Patient not taking: Reported on 3/29/2019), Disp: 180 Tab, Rfl: 3  •  rizatriptan (MAXALT) 5 MG tablet, TAKE 1 TABLET BY MOUTH ONCE AS NEEDED FOR MIGRAINE FOR 1 DOSE MAY REPEAT IN 2 HRS IF NEEDED (Patient not taking: Reported on 3/29/2019), Disp: 10 Tab, Rfl: 1  •  azithromycin (ZITHROMAX) 250 MG TABS, 2 tabs by mouth day 1, 1 tab by mouth days 2-5 (Patient not taking: Reported on 3/29/2019), Disp: 6 Tab, Rfl: 0  •  ofloxacin otic sol (FLOXIN OTIC) 0.3 % SOLN, Place 5 Drops in right ear 2 times a day. Use for 7 days. (Patient not taking: Reported on 3/29/2019), Disp: 5 mL, Rfl: 0  •  " tramadol (ULTRAM) 50 MG TABS, TAKE 1 TO 2 TABLETS BY MOUTH EVERY 8 HOURS AS NEEDED FOR MILD PAIN (Patient not taking: Reported on 3/29/2019), Disp: 60 Tab, Rfl: 0  •  hydrocodone-acetaminophen (VICODIN) 5-500 MG TABS, TAKE 1 TABLET BY MOUTH TWICE A DAY AS NEEDED (Patient not taking: Reported on 3/29/2019), Disp: 60 Each, Rfl: 0  •  cyclobenzaprine (FLEXERIL) 5 MG tablet, TAKE 1 TABLET BY MOUTH TWICE A DAY AS NEEDED FOR MUSCLE SPASMS (Patient not taking: Reported on 3/29/2019), Disp: 60 Tab, Rfl: 0  •  hydrocodone-acetaminophen (VICODIN ES) 7.5-750 MG per tablet, Take 1 Tab by mouth 2 times a day as needed for Mild Pain. (Patient not taking: Reported on 3/29/2019), Disp: 60 Each, Rfl: 0  •  Blood Glucose Monitoring Suppl SUPPLIES MISC, Test strips order: Test strips for One Touch Ultra 2 meter. Sig: use 4 and prn ssx high or low sugar, Disp: 100 Each, Rfl: 3  •  Ramipril (ALTACE) 10 MG TABS, Take 1 Tab by mouth every day. (Patient not taking: Reported on 3/29/2019), Disp: 90 Tab, Rfl: 3  •  glucose blood (FREESTYLE TEST STRIPS) strip, Testing 3 times a day or prn , Disp: 30 Strip, Rfl: 0  •  topiramate (TOPAMAX) 25 MG capsule, Take 1 Cap by mouth every day. (Patient not taking: Reported on 3/29/2019), Disp: 60 Cap, Rfl: 0  •  ibuprofen (MOTRIN) 600 MG TABS, Take 1 Tab by mouth every 8 hours as needed for Mild Pain. AS NEEDED FOR MILD PAIN, Disp: 90 Tab, Rfl: 0  •  ondansetron (ZOFRAN) 4 MG TABS, Take 1 Tab by mouth every 8 hours as needed for Nausea/Vomiting. (Patient not taking: Reported on 3/29/2019), Disp: 20 Tab, Rfl: 1  •  fexofenadine (ALLEGRA) 180 MG tablet, Take 1 Tab by mouth every day. (Patient not taking: Reported on 3/29/2019), Disp: 90 Tab, Rfl: 1  •  atorvastatin (LIPITOR) 20 MG TABS, Take 1 Tab by mouth every day. (Patient not taking: Reported on 3/29/2019), Disp: 90 Each, Rfl: 3  •  sildenafil citrate (VIAGRA) 100 MG tablet, Take 1 Tab by mouth as needed for Erectile Dysfunction., Disp: 10 Tab,  "Rfl: 5  ALLERGIES: No Known Allergies  SURGHX:   Past Surgical History:   Procedure Laterality Date   • ARTHROSCOPY, KNEE      menis tear bilateral   • TONSILLECTOMY       SOCHX:  reports that he has never smoked. He has never used smokeless tobacco. He reports that he drinks alcohol. He reports that he does not use drugs.  FH: Family history was reviewed, no pertinent findings to report    Review of Systems   Constitutional: Negative for chills, fever and malaise/fatigue.   HENT: Positive for congestion and ear pain. Negative for ear discharge, hearing loss, sinus pain, sore throat and tinnitus.    Eyes: Negative for discharge and redness.   Respiratory: Negative for cough, shortness of breath and wheezing.    Gastrointestinal: Negative for abdominal pain, constipation, diarrhea, nausea and vomiting.   Musculoskeletal: Negative for myalgias and neck pain.   Skin: Negative for itching and rash.   Neurological: Negative for dizziness, weakness and headaches.   Endo/Heme/Allergies: Positive for environmental allergies.   All other systems reviewed and are negative.         Objective:     /88   Pulse 100   Temp 36.6 °C (97.8 °F) (Temporal)   Ht 1.765 m (5' 9.5\")   Wt 108.9 kg (240 lb)   SpO2 93%   BMI 34.93 kg/m²      Physical Exam   Constitutional: He is oriented to person, place, and time. Vital signs are normal. He appears well-developed and well-nourished. He is active and cooperative.  Non-toxic appearance. He does not have a sickly appearance. He does not appear ill. No distress.   HENT:   Head: Normocephalic.   Right Ear: External ear and ear canal normal. Tympanic membrane is injected. A middle ear effusion is present.   Left Ear: External ear and ear canal normal. Tympanic membrane is injected. A middle ear effusion is present.   Nose: Mucosal edema and rhinorrhea present.   Mouth/Throat: Uvula is midline. Mucous membranes are dry. No uvula swelling. Posterior oropharyngeal erythema present. "   Eyes: Pupils are equal, round, and reactive to light. Conjunctivae and EOM are normal.   Neck: Normal range of motion. Neck supple.   Cardiovascular: Normal rate and regular rhythm.    Pulmonary/Chest: Effort normal and breath sounds normal. No accessory muscle usage. No respiratory distress. He has no decreased breath sounds. He has no wheezes. He has no rhonchi. He has no rales.   Musculoskeletal: Normal range of motion.   Lymphadenopathy:     He has no cervical adenopathy.   Neurological: He is alert and oriented to person, place, and time.   Skin: Skin is warm and dry. He is not diaphoretic.   Vitals reviewed.              Assessment/Plan:     1. Acute pain of right ear    2. Nasal sinus congestion    3. PND (post-nasal drip)    4. Eustachian tube dysfunction, right    Increase water intake  Get rest  May use Ibuprofen/Tylenol prn for any fever, ear or throat pain  May take longer acting antihistamine for seasonal allergy symptoms prn  May use saline nasal spray for nasal congestion  May use Flonase or Nasocort for allergen nasal congestion  May gargle with salt water prn for throat discomfort  May drink smoothies for nutrition if too painful to swallow solid foods  Monitor for productive cough, SOB and chest pain/tightness- need re-evaluation

## 2019-06-10 ENCOUNTER — OFFICE VISIT (OUTPATIENT)
Dept: URGENT CARE | Facility: PHYSICIAN GROUP | Age: 54
End: 2019-06-10
Payer: COMMERCIAL

## 2019-06-10 VITALS
TEMPERATURE: 98.2 F | OXYGEN SATURATION: 94 % | HEART RATE: 76 BPM | DIASTOLIC BLOOD PRESSURE: 90 MMHG | WEIGHT: 240 LBS | BODY MASS INDEX: 34.93 KG/M2 | SYSTOLIC BLOOD PRESSURE: 160 MMHG | RESPIRATION RATE: 16 BRPM

## 2019-06-10 DIAGNOSIS — J01.00 ACUTE NON-RECURRENT MAXILLARY SINUSITIS: ICD-10-CM

## 2019-06-10 DIAGNOSIS — R05.9 COUGH: ICD-10-CM

## 2019-06-10 PROCEDURE — 99214 OFFICE O/P EST MOD 30 MIN: CPT | Performed by: PHYSICIAN ASSISTANT

## 2019-06-10 RX ORDER — BENZONATATE 100 MG/1
100 CAPSULE ORAL 3 TIMES DAILY PRN
Qty: 60 CAP | Refills: 0 | Status: SHIPPED | OUTPATIENT
Start: 2019-06-10

## 2019-06-10 RX ORDER — M-VIT,TX,IRON,MINS/CALC/FOLIC 27MG-0.4MG
1 TABLET ORAL DAILY
COMMUNITY

## 2019-06-10 RX ORDER — AMOXICILLIN AND CLAVULANATE POTASSIUM 875; 125 MG/1; MG/1
1 TABLET, FILM COATED ORAL 2 TIMES DAILY
Qty: 20 TAB | Refills: 0 | Status: SHIPPED | OUTPATIENT
Start: 2019-06-10 | End: 2019-06-20

## 2019-06-10 ASSESSMENT — ENCOUNTER SYMPTOMS
SHORTNESS OF BREATH: 0
FEVER: 1
COUGH: 1

## 2019-06-10 ASSESSMENT — COPD QUESTIONNAIRES: COPD: 0

## 2019-06-10 NOTE — LETTER
Danielle 10, 2019    To Whom It May Concern:         This is confirmation that Salas Gutierreznis Misha attended his scheduled appointment with Rhys Dinh P.A.-C. on 6/10/19. Please excuse him from work on 6/10 - 6/12.         If you have any questions please do not hesitate to call me at the phone number listed below.    Sincerely,          Rhys Dinh P.A.-C.  [phone]

## 2019-06-10 NOTE — PROGRESS NOTES
Subjective:   Salas Cabral is a 53 y.o. male who presents for Congestion (chest congestion,brown mucus, x 7 days)        Cough   This is a new problem. The current episode started 1 to 4 weeks ago. The problem has been unchanged. The problem occurs constantly. The cough is productive of purulent sputum. Associated symptoms include ear congestion, a fever, nasal congestion and postnasal drip. Pertinent negatives include no ear pain or shortness of breath. Nothing aggravates the symptoms. Treatments tried: mucinex, flonase, dayquil, and nyquil. There is no history of asthma, bronchitis, COPD, emphysema or pneumonia.     Review of Systems   Constitutional: Positive for fever.   HENT: Positive for postnasal drip. Negative for ear pain.    Respiratory: Positive for cough. Negative for shortness of breath.        PMH:  has a past medical history of ADD (attention deficit disorder with hyperactivity) (5/4/2012); Depression (5/4/2012); Diabetes mellitus type 2, uncontrolled (HCC) (5/4/2012); ED (erectile dysfunction) (5/4/2012); Hyperlipidemia (5/4/2012); and Hypertension (5/4/2012).  MEDS:   Current Outpatient Prescriptions:   •  Zinc 50 MG Cap, Take 50 mg by mouth every day., Disp: , Rfl:   •  therapeutic multivitamin-minerals (THERAGRAN-M) Tab, Take 1 Tab by mouth every day., Disp: , Rfl:   •  amoxicillin-clavulanate (AUGMENTIN) 875-125 MG Tab, Take 1 Tab by mouth 2 times a day for 10 days., Disp: 20 Tab, Rfl: 0  •  benzonatate (TESSALON) 100 MG Cap, Take 1 Cap by mouth 3 times a day as needed for Cough., Disp: 60 Cap, Rfl: 0  •  JARDIANCE 10 MG Tab, Take 1 Tab by mouth., Disp: , Rfl: 3  •  pioglitazone (ACTOS) 30 MG Tab, TAKE 1 TABLET BY MOUTH EVERY DAY, Disp: 90 Tab, Rfl: 1  •  Dapagliflozin-Metformin HCl ER 5-1000 MG TABLET SR 24 HR, Take 1 tablet by mouth 2 Times a Day., Disp: 180 Tab, Rfl: 3  •  Dulaglutide (TRULICITY) 1.5 MG/0.5ML Solution Pen-injector, Inject 0.5 mL as instructed every 7 days., Disp: 12 PEN,  Rfl: 4  •  Magnesium 250 MG Tab, Take  by mouth., Disp: , Rfl:   •  Omega-3 Fatty Acids (FISH OIL) 1000 MG Cap capsule, Take 1,000 mg by mouth 3 times a day, with meals., Disp: , Rfl:   •  Potassium (POTASSIMIN PO), Take  by mouth., Disp: , Rfl:   •  duloxetine (CYMBALTA) 60 MG CPEP, TAKE ONE CAPSULE BY MOUTH EVERY DAY, Disp: 30 Cap, Rfl: 5  •  Blood Glucose Monitoring Suppl SUPPLIES MISC, Test strips order: Test strips for One Touch Ultra 2 meter. Sig: use 4 and prn ssx high or low sugar, Disp: 100 Each, Rfl: 3  •  glucose blood (FREESTYLE TEST STRIPS) strip, Testing 3 times a day or prn , Disp: 30 Strip, Rfl: 0  •  metformin (GLUCOPHAGE) 1000 MG tablet, TAKE 1 TABLET BY MOUTH TWICE A DAY, Disp: , Rfl: 3  •  rizatriptan (MAXALT) 5 MG tablet, TAKE 1 TABLET BY MOUTH ONCE AS NEEDED FOR MIGRAINE FOR 1 DOSE MAY REPEAT IN 2 HRS IF NEEDED (Patient not taking: Reported on 3/29/2019), Disp: 10 Tab, Rfl: 1  •  azithromycin (ZITHROMAX) 250 MG TABS, 2 tabs by mouth day 1, 1 tab by mouth days 2-5 (Patient not taking: Reported on 3/29/2019), Disp: 6 Tab, Rfl: 0  •  ofloxacin otic sol (FLOXIN OTIC) 0.3 % SOLN, Place 5 Drops in right ear 2 times a day. Use for 7 days. (Patient not taking: Reported on 3/29/2019), Disp: 5 mL, Rfl: 0  •  tramadol (ULTRAM) 50 MG TABS, TAKE 1 TO 2 TABLETS BY MOUTH EVERY 8 HOURS AS NEEDED FOR MILD PAIN (Patient not taking: Reported on 3/29/2019), Disp: 60 Tab, Rfl: 0  •  hydrocodone-acetaminophen (VICODIN) 5-500 MG TABS, TAKE 1 TABLET BY MOUTH TWICE A DAY AS NEEDED (Patient not taking: Reported on 3/29/2019), Disp: 60 Each, Rfl: 0  •  cyclobenzaprine (FLEXERIL) 5 MG tablet, TAKE 1 TABLET BY MOUTH TWICE A DAY AS NEEDED FOR MUSCLE SPASMS (Patient not taking: Reported on 3/29/2019), Disp: 60 Tab, Rfl: 0  •  hydrocodone-acetaminophen (VICODIN ES) 7.5-750 MG per tablet, Take 1 Tab by mouth 2 times a day as needed for Mild Pain. (Patient not taking: Reported on 3/29/2019), Disp: 60 Each, Rfl: 0  •   Ramipril (ALTACE) 10 MG TABS, Take 1 Tab by mouth every day. (Patient not taking: Reported on 3/29/2019), Disp: 90 Tab, Rfl: 3  •  topiramate (TOPAMAX) 25 MG capsule, Take 1 Cap by mouth every day. (Patient not taking: Reported on 3/29/2019), Disp: 60 Cap, Rfl: 0  •  ibuprofen (MOTRIN) 600 MG TABS, Take 1 Tab by mouth every 8 hours as needed for Mild Pain. AS NEEDED FOR MILD PAIN, Disp: 90 Tab, Rfl: 0  •  ondansetron (ZOFRAN) 4 MG TABS, Take 1 Tab by mouth every 8 hours as needed for Nausea/Vomiting. (Patient not taking: Reported on 3/29/2019), Disp: 20 Tab, Rfl: 1  •  fexofenadine (ALLEGRA) 180 MG tablet, Take 1 Tab by mouth every day. (Patient not taking: Reported on 3/29/2019), Disp: 90 Tab, Rfl: 1  •  atorvastatin (LIPITOR) 20 MG TABS, Take 1 Tab by mouth every day. (Patient not taking: Reported on 3/29/2019), Disp: 90 Each, Rfl: 3  •  sildenafil citrate (VIAGRA) 100 MG tablet, Take 1 Tab by mouth as needed for Erectile Dysfunction., Disp: 10 Tab, Rfl: 5  ALLERGIES: No Known Allergies  SURGHX:   Past Surgical History:   Procedure Laterality Date   • ARTHROSCOPY, KNEE      menis tear bilateral   • TONSILLECTOMY       SOCHX:  reports that he has never smoked. He has never used smokeless tobacco. He reports that he drinks alcohol. He reports that he does not use drugs.  FH: Family history was reviewed, no pertinent findings to report   Objective:   /90 (BP Location: Right arm, Patient Position: Sitting, BP Cuff Size: Adult)   Pulse 76   Temp 36.8 °C (98.2 °F)   Resp 16   Wt 108.9 kg (240 lb)   SpO2 94%   BMI 34.93 kg/m²   Physical Exam   Constitutional: Vital signs are normal. He appears well-developed and well-nourished.  Non-toxic appearance. No distress.   HENT:   Head: Normocephalic and atraumatic.   Right Ear: Tympanic membrane, external ear and ear canal normal.   Left Ear: Tympanic membrane, external ear and ear canal normal.   Nose: Mucosal edema and rhinorrhea present. Right sinus exhibits  maxillary sinus tenderness and frontal sinus tenderness. Left sinus exhibits maxillary sinus tenderness and frontal sinus tenderness.   Mouth/Throat: Uvula is midline and mucous membranes are normal. Posterior oropharyngeal erythema present.   Significant congestion.    Eyes: Lids are normal.   Neck: Neck supple.   Cardiovascular: Normal rate, regular rhythm, S1 normal, S2 normal and normal heart sounds.  Exam reveals no gallop and no friction rub.    No murmur heard.  Pulmonary/Chest: Effort normal and breath sounds normal. No respiratory distress. He has no decreased breath sounds. He has no wheezes. He has no rhonchi. He has no rales.   Neurological: He is alert.   Skin: Skin is warm and dry. Capillary refill takes less than 2 seconds.   Psychiatric: He has a normal mood and affect. His speech is normal and behavior is normal. Judgment and thought content normal. Cognition and memory are normal.   Vitals reviewed.        Assessment/Plan:   1. Acute non-recurrent maxillary sinusitis  - amoxicillin-clavulanate (AUGMENTIN) 875-125 MG Tab; Take 1 Tab by mouth 2 times a day for 10 days.  Dispense: 20 Tab; Refill: 0    2. Cough  - benzonatate (TESSALON) 100 MG Cap; Take 1 Cap by mouth 3 times a day as needed for Cough.  Dispense: 60 Cap; Refill: 0    Other orders  - Zinc 50 MG Cap; Take 50 mg by mouth every day.  - therapeutic multivitamin-minerals (THERAGRAN-M) Tab; Take 1 Tab by mouth every day.    Pt instructed to complete full course of medication despite symptomatic improvement. Pt to take med with meals to alleviate GI upset. Pt to take a probiotic or eat Bre’s yogurt/ kefir while taking the medication.    Flonase 1 squirt in each nostril, as instructed in clinic, once a day.  Use nasal saline TID to promote drainage.   Salt water gurgles to soothe sore throat.  Ayr saline gel to moisturize nasal passage and prevent bleeding.  Try to use sudafed sparingly in order to allow sinuses to drain.  Avoid the longer  formulations and try to take only in the morning and/or at noon if needed.  If you fail to improve in 3-5 days or symptoms worsen/new symptoms develop, RTC for reevaluation    Differential diagnosis, natural history, supportive care, and indications for immediate follow-up discussed.

## 2019-11-02 ENCOUNTER — HOSPITAL ENCOUNTER (OUTPATIENT)
Dept: LAB | Facility: MEDICAL CENTER | Age: 54
End: 2019-11-02
Attending: INTERNAL MEDICINE
Payer: COMMERCIAL

## 2019-11-02 LAB
ALBUMIN SERPL BCP-MCNC: 4.3 G/DL (ref 3.2–4.9)
ALBUMIN/GLOB SERPL: 1.4 G/DL
ALP SERPL-CCNC: 92 U/L (ref 30–99)
ALT SERPL-CCNC: 27 U/L (ref 2–50)
ANION GAP SERPL CALC-SCNC: 5 MMOL/L (ref 0–11.9)
AST SERPL-CCNC: 17 U/L (ref 12–45)
BILIRUB SERPL-MCNC: 0.8 MG/DL (ref 0.1–1.5)
BUN SERPL-MCNC: 14 MG/DL (ref 8–22)
CALCIUM SERPL-MCNC: 9.2 MG/DL (ref 8.5–10.5)
CHLORIDE SERPL-SCNC: 102 MMOL/L (ref 96–112)
CHOLEST SERPL-MCNC: 206 MG/DL (ref 100–199)
CO2 SERPL-SCNC: 31 MMOL/L (ref 20–33)
CREAT SERPL-MCNC: 0.77 MG/DL (ref 0.5–1.4)
CREAT UR-MCNC: 85 MG/DL
EST. AVERAGE GLUCOSE BLD GHB EST-MCNC: 229 MG/DL
FASTING STATUS PATIENT QL REPORTED: NORMAL
GLOBULIN SER CALC-MCNC: 3.1 G/DL (ref 1.9–3.5)
GLUCOSE SERPL-MCNC: 170 MG/DL (ref 65–99)
HBA1C MFR BLD: 9.6 % (ref 0–5.6)
HDLC SERPL-MCNC: 33 MG/DL
LDLC SERPL CALC-MCNC: 128 MG/DL
MICROALBUMIN UR-MCNC: 0.7 MG/DL
MICROALBUMIN/CREAT UR: 8 MG/G (ref 0–30)
POTASSIUM SERPL-SCNC: 4.1 MMOL/L (ref 3.6–5.5)
PROT SERPL-MCNC: 7.4 G/DL (ref 6–8.2)
SODIUM SERPL-SCNC: 138 MMOL/L (ref 135–145)
TRIGL SERPL-MCNC: 224 MG/DL (ref 0–149)

## 2019-11-02 PROCEDURE — 82570 ASSAY OF URINE CREATININE: CPT

## 2019-11-02 PROCEDURE — 82043 UR ALBUMIN QUANTITATIVE: CPT

## 2019-11-02 PROCEDURE — 36415 COLL VENOUS BLD VENIPUNCTURE: CPT

## 2019-11-02 PROCEDURE — 80053 COMPREHEN METABOLIC PANEL: CPT

## 2019-11-02 PROCEDURE — 83036 HEMOGLOBIN GLYCOSYLATED A1C: CPT

## 2019-11-02 PROCEDURE — 80061 LIPID PANEL: CPT

## 2020-07-24 ENCOUNTER — HOSPITAL ENCOUNTER (OUTPATIENT)
Dept: LAB | Facility: MEDICAL CENTER | Age: 55
End: 2020-07-24
Attending: INTERNAL MEDICINE
Payer: COMMERCIAL

## 2020-07-24 LAB
ANION GAP SERPL CALC-SCNC: 15 MMOL/L (ref 7–16)
BUN SERPL-MCNC: 23 MG/DL (ref 8–22)
CALCIUM SERPL-MCNC: 9 MG/DL (ref 8.5–10.5)
CHLORIDE SERPL-SCNC: 98 MMOL/L (ref 96–112)
CHOLEST SERPL-MCNC: 157 MG/DL (ref 100–199)
CO2 SERPL-SCNC: 26 MMOL/L (ref 20–33)
CREAT SERPL-MCNC: 0.71 MG/DL (ref 0.5–1.4)
CREAT UR-MCNC: 100.81 MG/DL
EST. AVERAGE GLUCOSE BLD GHB EST-MCNC: 212 MG/DL
FASTING STATUS PATIENT QL REPORTED: NORMAL
GLUCOSE SERPL-MCNC: 176 MG/DL (ref 65–99)
HBA1C MFR BLD: 9 % (ref 0–5.6)
HDLC SERPL-MCNC: 31 MG/DL
LDLC SERPL CALC-MCNC: 92 MG/DL
MICROALBUMIN UR-MCNC: 1.2 MG/DL
MICROALBUMIN/CREAT UR: 12 MG/G (ref 0–30)
POTASSIUM SERPL-SCNC: 4.2 MMOL/L (ref 3.6–5.5)
SODIUM SERPL-SCNC: 139 MMOL/L (ref 135–145)
TRIGL SERPL-MCNC: 171 MG/DL (ref 0–149)

## 2020-07-24 PROCEDURE — 83036 HEMOGLOBIN GLYCOSYLATED A1C: CPT

## 2020-07-24 PROCEDURE — 80048 BASIC METABOLIC PNL TOTAL CA: CPT

## 2020-07-24 PROCEDURE — 82043 UR ALBUMIN QUANTITATIVE: CPT

## 2020-07-24 PROCEDURE — 82570 ASSAY OF URINE CREATININE: CPT

## 2020-07-24 PROCEDURE — 80061 LIPID PANEL: CPT

## 2020-07-24 PROCEDURE — 36415 COLL VENOUS BLD VENIPUNCTURE: CPT

## 2020-07-27 ENCOUNTER — OFFICE VISIT (OUTPATIENT)
Dept: URGENT CARE | Facility: PHYSICIAN GROUP | Age: 55
End: 2020-07-27
Payer: COMMERCIAL

## 2020-07-27 ENCOUNTER — HOSPITAL ENCOUNTER (OUTPATIENT)
Facility: MEDICAL CENTER | Age: 55
End: 2020-07-27
Attending: NURSE PRACTITIONER
Payer: COMMERCIAL

## 2020-07-27 VITALS
OXYGEN SATURATION: 96 % | RESPIRATION RATE: 16 BRPM | DIASTOLIC BLOOD PRESSURE: 70 MMHG | BODY MASS INDEX: 34.36 KG/M2 | TEMPERATURE: 97 F | SYSTOLIC BLOOD PRESSURE: 126 MMHG | HEART RATE: 107 BPM | WEIGHT: 240 LBS | HEIGHT: 70 IN

## 2020-07-27 DIAGNOSIS — R53.83 FATIGUE, UNSPECIFIED TYPE: ICD-10-CM

## 2020-07-27 DIAGNOSIS — Z20.822 EXPOSURE TO COVID-19 VIRUS: ICD-10-CM

## 2020-07-27 LAB — COVID ORDER STATUS COVID19: NORMAL

## 2020-07-27 PROCEDURE — U0003 INFECTIOUS AGENT DETECTION BY NUCLEIC ACID (DNA OR RNA); SEVERE ACUTE RESPIRATORY SYNDROME CORONAVIRUS 2 (SARS-COV-2) (CORONAVIRUS DISEASE [COVID-19]), AMPLIFIED PROBE TECHNIQUE, MAKING USE OF HIGH THROUGHPUT TECHNOLOGIES AS DESCRIBED BY CMS-2020-01-R: HCPCS

## 2020-07-27 PROCEDURE — 99214 OFFICE O/P EST MOD 30 MIN: CPT | Mod: CS | Performed by: NURSE PRACTITIONER

## 2020-07-27 ASSESSMENT — ENCOUNTER SYMPTOMS
WHEEZING: 0
CHILLS: 0
FEVER: 0
SHORTNESS OF BREATH: 0
HEARTBURN: 0
NAUSEA: 0
DIARRHEA: 0
SPUTUM PRODUCTION: 0
COUGH: 0
VOMITING: 0
DIZZINESS: 0
FATIGUE: 1
MYALGIAS: 0
ABDOMINAL PAIN: 0
HEADACHES: 0

## 2020-07-28 LAB
SARS-COV-2 RNA RESP QL NAA+PROBE: NOTDETECTED
SPECIMEN SOURCE: NORMAL

## 2020-07-28 NOTE — PROGRESS NOTES
Subjective:   Salas Cabral  is a 54 y.o. male who presents for Fatigue (extreme fatigue x1 week: came in contact with covid-19)        54-year-old male patient reports urgent care for new problem that started approximately 7 days ago.  Patient states that he is taken care of 3 members that have been positive for COVID.  Is exhibiting extreme fatigue.  States that he has had some diarrhea, subjective fever.  Denies shortness of breath, wheezing, difficulty breathing, nausea, vomiting.  Is currently taking over-the-counter NSAIDs and Tylenol with mild relief of symptoms.    Fatigue   This is a new problem. The current episode started in the past 7 days. The problem occurs constantly. The problem has been gradually worsening. Associated symptoms include fatigue. Pertinent negatives include no abdominal pain, chills, coughing, fever, headaches, myalgias, nausea or vomiting. Nothing aggravates the symptoms. He has tried nothing for the symptoms.     Review of Systems   Constitutional: Positive for fatigue and malaise/fatigue. Negative for chills and fever.   Respiratory: Negative for cough, sputum production, shortness of breath and wheezing.    Gastrointestinal: Negative for abdominal pain, diarrhea, heartburn, nausea and vomiting.   Musculoskeletal: Negative for myalgias.   Neurological: Negative for dizziness and headaches.     Past Medical History:   Diagnosis Date   • ADD (attention deficit disorder with hyperactivity) 5/4/2012   • Depression 5/4/2012   • Diabetes mellitus type 2, uncontrolled (HCC) 5/4/2012   • ED (erectile dysfunction) 5/4/2012   • Hyperlipidemia 5/4/2012   • Hypertension 5/4/2012      Past Surgical History:   Procedure Laterality Date   • ARTHROSCOPY, KNEE      menis tear bilateral   • TONSILLECTOMY        Social History     Socioeconomic History   • Marital status:      Spouse name: Not on file   • Number of children: Not on file   • Years of education: Not on file   • Highest  "education level: Not on file   Occupational History   • Not on file   Social Needs   • Financial resource strain: Not on file   • Food insecurity     Worry: Not on file     Inability: Not on file   • Transportation needs     Medical: Not on file     Non-medical: Not on file   Tobacco Use   • Smoking status: Current Every Day Smoker   • Smokeless tobacco: Never Used   Substance and Sexual Activity   • Alcohol use: Yes     Comment: occasional   • Drug use: No     Comment: works as an    • Sexual activity: Not on file   Lifestyle   • Physical activity     Days per week: Not on file     Minutes per session: Not on file   • Stress: Not on file   Relationships   • Social connections     Talks on phone: Not on file     Gets together: Not on file     Attends [employer] service: Not on file     Active member of club or organization: Not on file     Attends meetings of clubs or organizations: Not on file     Relationship status: Not on file   • Intimate partner violence     Fear of current or ex partner: Not on file     Emotionally abused: Not on file     Physically abused: Not on file     Forced sexual activity: Not on file   Other Topics Concern   • Not on file   Social History Narrative   • Not on file    Patient has no known allergies.       Objective:   /70   Pulse (!) 107   Temp 36.1 °C (97 °F) (Temporal)   Resp 16   Ht 1.778 m (5' 10\")   Wt 108.9 kg (240 lb)   SpO2 96%   BMI 34.44 kg/m²   Physical Exam  Vitals signs reviewed.   Constitutional:       Appearance: He is ill-appearing.   Cardiovascular:      Rate and Rhythm: Normal rate and regular rhythm.      Heart sounds: Normal heart sounds.   Pulmonary:      Effort: Pulmonary effort is normal.      Breath sounds: Normal breath sounds.   Abdominal:      General: Abdomen is flat. Bowel sounds are normal. There is no distension.      Palpations: Abdomen is soft.      Tenderness: There is no abdominal tenderness. There is no guarding.   Skin:     " General: Skin is warm.   Neurological:      Mental Status: He is alert and oriented to person, place, and time.   Psychiatric:         Mood and Affect: Mood normal.         Behavior: Behavior normal.         Thought Content: Thought content normal.         Judgment: Judgment normal.           Assessment/Plan:      1. Exposure to COVID-19 virus  - COVID/SARS COV-2 PCR; Future    2. Fatigue, unspecified type  - COVID/SARS COV-2 PCR; Future    Discussed Physical examination findings with patient are likely viral in etiology and could be due to COVID so will perform testing. Advised patient to remain in self isolation until cleared by a negative test or the health department, if they test positive. Will call patient with results. Strict ER precautions provided for worsening symptoms including SOB, difficulty breathing or high fever unable to be controlled by OTC tylenol or NSAIDS. Viral illness are largely self limiting and patient should increase fluids using electrolyte enriched beverages as well as OTC medications such as tylenol and ibuprofen per 's instructions.     Supportive care, differential diagnoses, and indications for immediate follow-up discussed with patient.    Pathogenesis of diagnosis discussed including typical length and natural progression. Patient expresses understanding and agrees to plan.       Please note that this dictation was created using voice recognition software. I have made every reasonable attempt to correct obvious errors, but I expect that there are errors of grammar and possibly content that I did not discover before finalizing the note.

## 2020-08-03 ENCOUNTER — HOSPITAL ENCOUNTER (OUTPATIENT)
Dept: LAB | Facility: MEDICAL CENTER | Age: 55
End: 2020-08-03
Attending: INTERNAL MEDICINE
Payer: COMMERCIAL

## 2020-08-03 LAB
ALBUMIN SERPL BCP-MCNC: 4.5 G/DL (ref 3.2–4.9)
ALBUMIN/GLOB SERPL: 1.7 G/DL
ALP SERPL-CCNC: 62 U/L (ref 30–99)
ALT SERPL-CCNC: 31 U/L (ref 2–50)
ANION GAP SERPL CALC-SCNC: 12 MMOL/L (ref 7–16)
AST SERPL-CCNC: 18 U/L (ref 12–45)
BASOPHILS # BLD AUTO: 1 % (ref 0–1.8)
BASOPHILS # BLD: 0.04 K/UL (ref 0–0.12)
BILIRUB SERPL-MCNC: 0.5 MG/DL (ref 0.1–1.5)
BUN SERPL-MCNC: 19 MG/DL (ref 8–22)
CALCIUM SERPL-MCNC: 9.6 MG/DL (ref 8.5–10.5)
CHLORIDE SERPL-SCNC: 99 MMOL/L (ref 96–112)
CO2 SERPL-SCNC: 29 MMOL/L (ref 20–33)
CREAT SERPL-MCNC: 0.7 MG/DL (ref 0.5–1.4)
EOSINOPHIL # BLD AUTO: 0.11 K/UL (ref 0–0.51)
EOSINOPHIL NFR BLD: 2.7 % (ref 0–6.9)
ERYTHROCYTE [DISTWIDTH] IN BLOOD BY AUTOMATED COUNT: 45.1 FL (ref 35.9–50)
FASTING STATUS PATIENT QL REPORTED: NORMAL
GLOBULIN SER CALC-MCNC: 2.6 G/DL (ref 1.9–3.5)
GLUCOSE SERPL-MCNC: 165 MG/DL (ref 65–99)
HCT VFR BLD AUTO: 51.2 % (ref 42–52)
HGB BLD-MCNC: 16.8 G/DL (ref 14–18)
IMM GRANULOCYTES # BLD AUTO: 0.01 K/UL (ref 0–0.11)
IMM GRANULOCYTES NFR BLD AUTO: 0.2 % (ref 0–0.9)
LYMPHOCYTES # BLD AUTO: 1.37 K/UL (ref 1–4.8)
LYMPHOCYTES NFR BLD: 33 % (ref 22–41)
MCH RBC QN AUTO: 29.2 PG (ref 27–33)
MCHC RBC AUTO-ENTMCNC: 32.8 G/DL (ref 33.7–35.3)
MCV RBC AUTO: 89 FL (ref 81.4–97.8)
MONOCYTES # BLD AUTO: 0.45 K/UL (ref 0–0.85)
MONOCYTES NFR BLD AUTO: 10.8 % (ref 0–13.4)
NEUTROPHILS # BLD AUTO: 2.17 K/UL (ref 1.82–7.42)
NEUTROPHILS NFR BLD: 52.3 % (ref 44–72)
NRBC # BLD AUTO: 0 K/UL
NRBC BLD-RTO: 0 /100 WBC
PLATELET # BLD AUTO: 270 K/UL (ref 164–446)
PMV BLD AUTO: 10.9 FL (ref 9–12.9)
POTASSIUM SERPL-SCNC: 4.2 MMOL/L (ref 3.6–5.5)
PROT SERPL-MCNC: 7.1 G/DL (ref 6–8.2)
RBC # BLD AUTO: 5.75 M/UL (ref 4.7–6.1)
SODIUM SERPL-SCNC: 140 MMOL/L (ref 135–145)
WBC # BLD AUTO: 4.2 K/UL (ref 4.8–10.8)

## 2020-08-03 PROCEDURE — 80053 COMPREHEN METABOLIC PANEL: CPT

## 2020-08-03 PROCEDURE — 36415 COLL VENOUS BLD VENIPUNCTURE: CPT

## 2020-08-03 PROCEDURE — 85025 COMPLETE CBC W/AUTO DIFF WBC: CPT

## 2021-06-23 ENCOUNTER — HOSPITAL ENCOUNTER (OUTPATIENT)
Dept: LAB | Facility: MEDICAL CENTER | Age: 56
End: 2021-06-23
Attending: INTERNAL MEDICINE
Payer: COMMERCIAL

## 2021-06-23 LAB
ANION GAP SERPL CALC-SCNC: 11 MMOL/L (ref 7–16)
BUN SERPL-MCNC: 19 MG/DL (ref 8–22)
CALCIUM SERPL-MCNC: 9.4 MG/DL (ref 8.5–10.5)
CHLORIDE SERPL-SCNC: 100 MMOL/L (ref 96–112)
CHOLEST SERPL-MCNC: 258 MG/DL (ref 100–199)
CO2 SERPL-SCNC: 26 MMOL/L (ref 20–33)
CREAT SERPL-MCNC: 0.57 MG/DL (ref 0.5–1.4)
CREAT UR-MCNC: 60.46 MG/DL
EST. AVERAGE GLUCOSE BLD GHB EST-MCNC: 263 MG/DL
FASTING STATUS PATIENT QL REPORTED: NORMAL
GLUCOSE SERPL-MCNC: 299 MG/DL (ref 65–99)
HBA1C MFR BLD: 10.8 % (ref 4–5.6)
HDLC SERPL-MCNC: 36 MG/DL
LDLC SERPL CALC-MCNC: 154 MG/DL
MICROALBUMIN UR-MCNC: <1.2 MG/DL
MICROALBUMIN/CREAT UR: NORMAL MG/G (ref 0–30)
POTASSIUM SERPL-SCNC: 4.2 MMOL/L (ref 3.6–5.5)
SODIUM SERPL-SCNC: 137 MMOL/L (ref 135–145)
TRIGL SERPL-MCNC: 340 MG/DL (ref 0–149)

## 2021-06-23 PROCEDURE — 80048 BASIC METABOLIC PNL TOTAL CA: CPT

## 2021-06-23 PROCEDURE — 36415 COLL VENOUS BLD VENIPUNCTURE: CPT

## 2021-06-23 PROCEDURE — 80061 LIPID PANEL: CPT

## 2021-06-23 PROCEDURE — 83036 HEMOGLOBIN GLYCOSYLATED A1C: CPT

## 2021-06-23 PROCEDURE — 82043 UR ALBUMIN QUANTITATIVE: CPT

## 2021-06-23 PROCEDURE — 82570 ASSAY OF URINE CREATININE: CPT

## 2021-11-23 ENCOUNTER — HOSPITAL ENCOUNTER (OUTPATIENT)
Dept: LAB | Facility: MEDICAL CENTER | Age: 56
End: 2021-11-23
Attending: INTERNAL MEDICINE
Payer: COMMERCIAL

## 2021-11-23 LAB
CREAT UR-MCNC: 75.13 MG/DL
EST. AVERAGE GLUCOSE BLD GHB EST-MCNC: 223 MG/DL
HBA1C MFR BLD: 9.4 % (ref 4–5.6)
HCV AB SER QL: NORMAL
MICROALBUMIN UR-MCNC: <1.2 MG/DL
MICROALBUMIN/CREAT UR: NORMAL MG/G (ref 0–30)

## 2021-11-23 PROCEDURE — 36415 COLL VENOUS BLD VENIPUNCTURE: CPT

## 2021-11-23 PROCEDURE — 82043 UR ALBUMIN QUANTITATIVE: CPT

## 2021-11-23 PROCEDURE — 86803 HEPATITIS C AB TEST: CPT

## 2021-11-23 PROCEDURE — 83036 HEMOGLOBIN GLYCOSYLATED A1C: CPT

## 2021-11-23 PROCEDURE — 82570 ASSAY OF URINE CREATININE: CPT

## 2021-11-30 ENCOUNTER — HOSPITAL ENCOUNTER (OUTPATIENT)
Facility: MEDICAL CENTER | Age: 56
End: 2021-11-30
Attending: INTERNAL MEDICINE
Payer: COMMERCIAL

## 2021-11-30 PROCEDURE — 82274 ASSAY TEST FOR BLOOD FECAL: CPT

## 2021-12-03 LAB — AMBIGUOUS DTTM AMBI4: NORMAL

## 2021-12-09 LAB — IMM ASSAY OCC BLD FITOB: NEGATIVE

## 2023-03-21 ENCOUNTER — HOSPITAL ENCOUNTER (OUTPATIENT)
Dept: LAB | Facility: MEDICAL CENTER | Age: 58
End: 2023-03-21
Attending: INTERNAL MEDICINE
Payer: COMMERCIAL

## 2023-03-21 LAB
ALBUMIN SERPL BCP-MCNC: 4.1 G/DL (ref 3.2–4.9)
ALBUMIN/GLOB SERPL: 1.5 G/DL
ALP SERPL-CCNC: 75 U/L (ref 30–99)
ALT SERPL-CCNC: 35 U/L (ref 2–50)
ANION GAP SERPL CALC-SCNC: 12 MMOL/L (ref 7–16)
AST SERPL-CCNC: 18 U/L (ref 12–45)
BASOPHILS # BLD AUTO: 1.1 % (ref 0–1.8)
BASOPHILS # BLD: 0.05 K/UL (ref 0–0.12)
BILIRUB SERPL-MCNC: 0.9 MG/DL (ref 0.1–1.5)
BUN SERPL-MCNC: 12 MG/DL (ref 8–22)
CALCIUM ALBUM COR SERPL-MCNC: 9.7 MG/DL (ref 8.5–10.5)
CALCIUM SERPL-MCNC: 9.8 MG/DL (ref 8.5–10.5)
CHLORIDE SERPL-SCNC: 103 MMOL/L (ref 96–112)
CHOLEST SERPL-MCNC: 122 MG/DL (ref 100–199)
CO2 SERPL-SCNC: 25 MMOL/L (ref 20–33)
CREAT SERPL-MCNC: 0.65 MG/DL (ref 0.5–1.4)
CREAT UR-MCNC: 88.94 MG/DL
EOSINOPHIL # BLD AUTO: 0.1 K/UL (ref 0–0.51)
EOSINOPHIL NFR BLD: 2.1 % (ref 0–6.9)
ERYTHROCYTE [DISTWIDTH] IN BLOOD BY AUTOMATED COUNT: 49.1 FL (ref 35.9–50)
EST. AVERAGE GLUCOSE BLD GHB EST-MCNC: 252 MG/DL
FASTING STATUS PATIENT QL REPORTED: NORMAL
GFR SERPLBLD CREATININE-BSD FMLA CKD-EPI: 110 ML/MIN/1.73 M 2
GLOBULIN SER CALC-MCNC: 2.7 G/DL (ref 1.9–3.5)
GLUCOSE SERPL-MCNC: 177 MG/DL (ref 65–99)
HBA1C MFR BLD: 10.4 % (ref 4–5.6)
HCT VFR BLD AUTO: 46.4 % (ref 42–52)
HDLC SERPL-MCNC: 39 MG/DL
HGB BLD-MCNC: 15.1 G/DL (ref 14–18)
IMM GRANULOCYTES # BLD AUTO: 0.01 K/UL (ref 0–0.11)
IMM GRANULOCYTES NFR BLD AUTO: 0.2 % (ref 0–0.9)
LDLC SERPL CALC-MCNC: 53 MG/DL
LYMPHOCYTES # BLD AUTO: 1.26 K/UL (ref 1–4.8)
LYMPHOCYTES NFR BLD: 26.5 % (ref 22–41)
MCH RBC QN AUTO: 28.8 PG (ref 27–33)
MCHC RBC AUTO-ENTMCNC: 32.5 G/DL (ref 33.7–35.3)
MCV RBC AUTO: 88.4 FL (ref 81.4–97.8)
MICROALBUMIN UR-MCNC: <1.2 MG/DL
MICROALBUMIN/CREAT UR: NORMAL MG/G (ref 0–30)
MONOCYTES # BLD AUTO: 0.4 K/UL (ref 0–0.85)
MONOCYTES NFR BLD AUTO: 8.4 % (ref 0–13.4)
NEUTROPHILS # BLD AUTO: 2.94 K/UL (ref 1.82–7.42)
NEUTROPHILS NFR BLD: 61.7 % (ref 44–72)
NRBC # BLD AUTO: 0 K/UL
NRBC BLD-RTO: 0 /100 WBC
PLATELET # BLD AUTO: 201 K/UL (ref 164–446)
PMV BLD AUTO: 10.3 FL (ref 9–12.9)
POTASSIUM SERPL-SCNC: 4.6 MMOL/L (ref 3.6–5.5)
PROT SERPL-MCNC: 6.8 G/DL (ref 6–8.2)
RBC # BLD AUTO: 5.25 M/UL (ref 4.7–6.1)
SODIUM SERPL-SCNC: 140 MMOL/L (ref 135–145)
TRIGL SERPL-MCNC: 150 MG/DL (ref 0–149)
WBC # BLD AUTO: 4.8 K/UL (ref 4.8–10.8)

## 2023-03-21 PROCEDURE — 83036 HEMOGLOBIN GLYCOSYLATED A1C: CPT

## 2023-03-21 PROCEDURE — 86803 HEPATITIS C AB TEST: CPT

## 2023-03-21 PROCEDURE — 82570 ASSAY OF URINE CREATININE: CPT

## 2023-03-21 PROCEDURE — 85025 COMPLETE CBC W/AUTO DIFF WBC: CPT

## 2023-03-21 PROCEDURE — 82043 UR ALBUMIN QUANTITATIVE: CPT

## 2023-03-21 PROCEDURE — 80061 LIPID PANEL: CPT

## 2023-03-21 PROCEDURE — 36415 COLL VENOUS BLD VENIPUNCTURE: CPT

## 2023-03-21 PROCEDURE — 80053 COMPREHEN METABOLIC PANEL: CPT

## 2023-03-23 LAB — HCV AB SER QL: NORMAL

## 2023-06-19 ENCOUNTER — HOSPITAL ENCOUNTER (OUTPATIENT)
Dept: LAB | Facility: MEDICAL CENTER | Age: 58
End: 2023-06-19
Attending: INTERNAL MEDICINE
Payer: COMMERCIAL

## 2023-06-19 PROCEDURE — 84403 ASSAY OF TOTAL TESTOSTERONE: CPT

## 2023-06-19 PROCEDURE — 84270 ASSAY OF SEX HORMONE GLOBUL: CPT

## 2023-06-19 PROCEDURE — 84402 ASSAY OF FREE TESTOSTERONE: CPT

## 2023-06-19 PROCEDURE — 36415 COLL VENOUS BLD VENIPUNCTURE: CPT

## 2023-06-21 LAB
SHBG SERPL-SCNC: 51 NMOL/L (ref 19–76)
TESTOST FREE MFR SERPL: 1.3 % (ref 1.6–2.9)
TESTOST FREE SERPL-MCNC: 18 PG/ML (ref 47–244)
TESTOST SERPL-MCNC: 137 NG/DL (ref 300–890)

## 2023-09-27 ENCOUNTER — HOSPITAL ENCOUNTER (OUTPATIENT)
Dept: LAB | Facility: MEDICAL CENTER | Age: 58
End: 2023-09-27
Attending: INTERNAL MEDICINE
Payer: COMMERCIAL

## 2023-09-27 LAB
25(OH)D3 SERPL-MCNC: 53 NG/ML (ref 30–100)
ALBUMIN SERPL BCP-MCNC: 4.7 G/DL (ref 3.2–4.9)
ALBUMIN/GLOB SERPL: 2 G/DL
ALP SERPL-CCNC: 68 U/L (ref 30–99)
ALT SERPL-CCNC: 23 U/L (ref 2–50)
ANION GAP SERPL CALC-SCNC: 10 MMOL/L (ref 7–16)
AST SERPL-CCNC: 17 U/L (ref 12–45)
BILIRUB SERPL-MCNC: 1.2 MG/DL (ref 0.1–1.5)
BUN SERPL-MCNC: 20 MG/DL (ref 8–22)
CALCIUM ALBUM COR SERPL-MCNC: 9.6 MG/DL (ref 8.5–10.5)
CALCIUM SERPL-MCNC: 10.2 MG/DL (ref 8.5–10.5)
CHLORIDE SERPL-SCNC: 99 MMOL/L (ref 96–112)
CHOLEST SERPL-MCNC: 184 MG/DL (ref 100–199)
CO2 SERPL-SCNC: 27 MMOL/L (ref 20–33)
CREAT SERPL-MCNC: 0.76 MG/DL (ref 0.5–1.4)
EST. AVERAGE GLUCOSE BLD GHB EST-MCNC: 186 MG/DL
FASTING STATUS PATIENT QL REPORTED: NORMAL
FSH SERPL-ACNC: 6.8 MIU/ML (ref 1.5–12.4)
GFR SERPLBLD CREATININE-BSD FMLA CKD-EPI: 105 ML/MIN/1.73 M 2
GLOBULIN SER CALC-MCNC: 2.4 G/DL (ref 1.9–3.5)
GLUCOSE SERPL-MCNC: 133 MG/DL (ref 65–99)
HBA1C MFR BLD: 8.1 % (ref 4–5.6)
HDLC SERPL-MCNC: 38 MG/DL
LDLC SERPL CALC-MCNC: 115 MG/DL
LH SERPL-ACNC: 1.8 IU/L (ref 1.7–8.6)
POTASSIUM SERPL-SCNC: 4.7 MMOL/L (ref 3.6–5.5)
PROLACTIN SERPL-MCNC: 13.9 NG/ML (ref 2.1–17.7)
PROT SERPL-MCNC: 7.1 G/DL (ref 6–8.2)
SODIUM SERPL-SCNC: 136 MMOL/L (ref 135–145)
T3FREE SERPL-MCNC: 3.32 PG/ML (ref 2–4.4)
T4 FREE SERPL-MCNC: 1.63 NG/DL (ref 0.93–1.7)
TESTOST SERPL-MCNC: 217 NG/DL (ref 175–781)
THYROPEROXIDASE AB SERPL-ACNC: <9 IU/ML (ref 0–9)
TRIGL SERPL-MCNC: 155 MG/DL (ref 0–149)
TSH SERPL DL<=0.005 MIU/L-ACNC: 1.34 UIU/ML (ref 0.38–5.33)
VIT B12 SERPL-MCNC: 475 PG/ML (ref 211–911)

## 2023-09-27 PROCEDURE — 84146 ASSAY OF PROLACTIN: CPT

## 2023-09-27 PROCEDURE — 83001 ASSAY OF GONADOTROPIN (FSH): CPT

## 2023-09-27 PROCEDURE — 84403 ASSAY OF TOTAL TESTOSTERONE: CPT

## 2023-09-27 PROCEDURE — 82043 UR ALBUMIN QUANTITATIVE: CPT

## 2023-09-27 PROCEDURE — 82607 VITAMIN B-12: CPT

## 2023-09-27 PROCEDURE — 84153 ASSAY OF PSA TOTAL: CPT

## 2023-09-27 PROCEDURE — 86376 MICROSOMAL ANTIBODY EACH: CPT

## 2023-09-27 PROCEDURE — 80053 COMPREHEN METABOLIC PANEL: CPT

## 2023-09-27 PROCEDURE — 84681 ASSAY OF C-PEPTIDE: CPT

## 2023-09-27 PROCEDURE — 84443 ASSAY THYROID STIM HORMONE: CPT

## 2023-09-27 PROCEDURE — 84270 ASSAY OF SEX HORMONE GLOBUL: CPT

## 2023-09-27 PROCEDURE — 80061 LIPID PANEL: CPT

## 2023-09-27 PROCEDURE — 84481 FREE ASSAY (FT-3): CPT

## 2023-09-27 PROCEDURE — 84154 ASSAY OF PSA FREE: CPT

## 2023-09-27 PROCEDURE — 86341 ISLET CELL ANTIBODY: CPT

## 2023-09-27 PROCEDURE — 36415 COLL VENOUS BLD VENIPUNCTURE: CPT

## 2023-09-27 PROCEDURE — 83036 HEMOGLOBIN GLYCOSYLATED A1C: CPT

## 2023-09-27 PROCEDURE — 82570 ASSAY OF URINE CREATININE: CPT

## 2023-09-27 PROCEDURE — 82306 VITAMIN D 25 HYDROXY: CPT

## 2023-09-27 PROCEDURE — 83002 ASSAY OF GONADOTROPIN (LH): CPT

## 2023-09-27 PROCEDURE — 84439 ASSAY OF FREE THYROXINE: CPT

## 2023-09-27 PROCEDURE — 86337 INSULIN ANTIBODIES: CPT

## 2023-09-28 LAB
CREAT UR-MCNC: 77.24 MG/DL
MICROALBUMIN UR-MCNC: <1.2 MG/DL
MICROALBUMIN/CREAT UR: NORMAL MG/G (ref 0–30)

## 2023-09-29 LAB
C PEPTIDE SERPL-MCNC: 2.7 NG/ML (ref 0.5–3.3)
PSA FREE MFR SERPL: 40 %
PSA FREE SERPL-MCNC: 0.2 NG/ML
PSA SERPL-MCNC: 0.5 NG/ML (ref 0–4)
SHBG SERPL-SCNC: 48 NMOL/L (ref 19–76)

## 2023-09-30 LAB — GAD65 AB SER IA-ACNC: <5 IU/ML (ref 0–5)

## 2023-10-01 LAB — ISLET CELL512 AB SER IA-ACNC: <5.4 U/ML (ref 0–7.4)

## 2023-10-02 LAB — INSULIN HUMAN AB SER-ACNC: <0.4 U/ML (ref 0–0.4)

## 2024-03-12 ENCOUNTER — HOSPITAL ENCOUNTER (OUTPATIENT)
Dept: LAB | Facility: MEDICAL CENTER | Age: 59
End: 2024-03-12
Attending: INTERNAL MEDICINE
Payer: COMMERCIAL

## 2024-03-12 LAB
ALBUMIN SERPL BCP-MCNC: 4.4 G/DL (ref 3.2–4.9)
ALBUMIN/GLOB SERPL: 1.9 G/DL
ALP SERPL-CCNC: 75 U/L (ref 30–99)
ALT SERPL-CCNC: 29 U/L (ref 2–50)
ANION GAP SERPL CALC-SCNC: 11 MMOL/L (ref 7–16)
AST SERPL-CCNC: 20 U/L (ref 12–45)
BILIRUB SERPL-MCNC: 0.8 MG/DL (ref 0.1–1.5)
BUN SERPL-MCNC: 13 MG/DL (ref 8–22)
CALCIUM ALBUM COR SERPL-MCNC: 8.9 MG/DL (ref 8.5–10.5)
CALCIUM SERPL-MCNC: 9.2 MG/DL (ref 8.5–10.5)
CHLORIDE SERPL-SCNC: 104 MMOL/L (ref 96–112)
CHOLEST SERPL-MCNC: 200 MG/DL (ref 100–199)
CO2 SERPL-SCNC: 24 MMOL/L (ref 20–33)
CREAT SERPL-MCNC: 0.56 MG/DL (ref 0.5–1.4)
CREAT UR-MCNC: 79.38 MG/DL
EST. AVERAGE GLUCOSE BLD GHB EST-MCNC: 160 MG/DL
FASTING STATUS PATIENT QL REPORTED: NORMAL
GFR SERPLBLD CREATININE-BSD FMLA CKD-EPI: 115 ML/MIN/1.73 M 2
GLOBULIN SER CALC-MCNC: 2.3 G/DL (ref 1.9–3.5)
GLUCOSE SERPL-MCNC: 147 MG/DL (ref 65–99)
HBA1C MFR BLD: 7.2 % (ref 4–5.6)
HDLC SERPL-MCNC: 32 MG/DL
LDLC SERPL CALC-MCNC: 123 MG/DL
MICROALBUMIN UR-MCNC: <1.2 MG/DL
MICROALBUMIN/CREAT UR: NORMAL MG/G (ref 0–30)
POTASSIUM SERPL-SCNC: 4.4 MMOL/L (ref 3.6–5.5)
PROT SERPL-MCNC: 6.7 G/DL (ref 6–8.2)
SODIUM SERPL-SCNC: 139 MMOL/L (ref 135–145)
T3FREE SERPL-MCNC: 2.67 PG/ML (ref 2–4.4)
T4 FREE SERPL-MCNC: 1.41 NG/DL (ref 0.93–1.7)
TESTOST SERPL-MCNC: 456 NG/DL (ref 175–781)
THYROPEROXIDASE AB SERPL-ACNC: <9 IU/ML (ref 0–9)
TRIGL SERPL-MCNC: 226 MG/DL (ref 0–149)
TSH SERPL DL<=0.005 MIU/L-ACNC: 0.7 UIU/ML (ref 0.38–5.33)

## 2024-03-12 PROCEDURE — 84443 ASSAY THYROID STIM HORMONE: CPT

## 2024-03-12 PROCEDURE — 80061 LIPID PANEL: CPT

## 2024-03-12 PROCEDURE — 82043 UR ALBUMIN QUANTITATIVE: CPT

## 2024-03-12 PROCEDURE — 84439 ASSAY OF FREE THYROXINE: CPT

## 2024-03-12 PROCEDURE — 36415 COLL VENOUS BLD VENIPUNCTURE: CPT

## 2024-03-12 PROCEDURE — 83036 HEMOGLOBIN GLYCOSYLATED A1C: CPT

## 2024-03-12 PROCEDURE — 82570 ASSAY OF URINE CREATININE: CPT

## 2024-03-12 PROCEDURE — 84403 ASSAY OF TOTAL TESTOSTERONE: CPT

## 2024-03-12 PROCEDURE — 86376 MICROSOMAL ANTIBODY EACH: CPT

## 2024-03-12 PROCEDURE — 84270 ASSAY OF SEX HORMONE GLOBUL: CPT

## 2024-03-12 PROCEDURE — 80053 COMPREHEN METABOLIC PANEL: CPT

## 2024-03-12 PROCEDURE — 84481 FREE ASSAY (FT-3): CPT

## 2024-03-14 LAB — SHBG SERPL-SCNC: 35 NMOL/L (ref 19–76)

## 2024-06-04 ENCOUNTER — HOSPITAL ENCOUNTER (OUTPATIENT)
Dept: LAB | Facility: MEDICAL CENTER | Age: 59
End: 2024-06-04
Attending: INTERNAL MEDICINE
Payer: COMMERCIAL

## 2024-06-04 LAB
ALBUMIN SERPL BCP-MCNC: 4.5 G/DL (ref 3.2–4.9)
ALBUMIN/GLOB SERPL: 1.8 G/DL
ALP SERPL-CCNC: 84 U/L (ref 30–99)
ALT SERPL-CCNC: 42 U/L (ref 2–50)
ANION GAP SERPL CALC-SCNC: 14 MMOL/L (ref 7–16)
AST SERPL-CCNC: 20 U/L (ref 12–45)
BILIRUB SERPL-MCNC: 1.8 MG/DL (ref 0.1–1.5)
BUN SERPL-MCNC: 18 MG/DL (ref 8–22)
CA-I SERPL-SCNC: 1.3 MMOL/L (ref 1.1–1.3)
CALCIUM ALBUM COR SERPL-MCNC: 9.1 MG/DL (ref 8.5–10.5)
CALCIUM SERPL-MCNC: 9.5 MG/DL (ref 8.5–10.5)
CHLORIDE SERPL-SCNC: 102 MMOL/L (ref 96–112)
CHOLEST SERPL-MCNC: 98 MG/DL (ref 100–199)
CO2 SERPL-SCNC: 23 MMOL/L (ref 20–33)
CREAT SERPL-MCNC: 0.51 MG/DL (ref 0.5–1.4)
CREAT UR-MCNC: 75.17 MG/DL
EST. AVERAGE GLUCOSE BLD GHB EST-MCNC: 163 MG/DL
GFR SERPLBLD CREATININE-BSD FMLA CKD-EPI: 118 ML/MIN/1.73 M 2
GLOBULIN SER CALC-MCNC: 2.5 G/DL (ref 1.9–3.5)
GLUCOSE SERPL-MCNC: 135 MG/DL (ref 65–99)
HBA1C MFR BLD: 7.3 % (ref 4–5.6)
HDLC SERPL-MCNC: 36 MG/DL
LDLC SERPL CALC-MCNC: 42 MG/DL
MAGNESIUM SERPL-MCNC: 2 MG/DL (ref 1.5–2.5)
MICROALBUMIN UR-MCNC: <1.2 MG/DL
MICROALBUMIN/CREAT UR: NORMAL MG/G (ref 0–30)
PHOSPHATE SERPL-MCNC: 2.8 MG/DL (ref 2.5–4.5)
POTASSIUM SERPL-SCNC: 4.5 MMOL/L (ref 3.6–5.5)
PROT SERPL-MCNC: 7 G/DL (ref 6–8.2)
PTH-INTACT SERPL-MCNC: 62.8 PG/ML (ref 14–72)
SODIUM SERPL-SCNC: 139 MMOL/L (ref 135–145)
T3FREE SERPL-MCNC: 3.12 PG/ML (ref 2–4.4)
T4 FREE SERPL-MCNC: 1.67 NG/DL (ref 0.93–1.7)
TESTOST SERPL-MCNC: 245 NG/DL (ref 175–781)
TRIGL SERPL-MCNC: 98 MG/DL (ref 0–149)
TSH SERPL DL<=0.005 MIU/L-ACNC: 0.57 UIU/ML (ref 0.38–5.33)

## 2024-06-04 PROCEDURE — 83735 ASSAY OF MAGNESIUM: CPT

## 2024-06-04 PROCEDURE — 82043 UR ALBUMIN QUANTITATIVE: CPT

## 2024-06-04 PROCEDURE — 83036 HEMOGLOBIN GLYCOSYLATED A1C: CPT

## 2024-06-04 PROCEDURE — 36415 COLL VENOUS BLD VENIPUNCTURE: CPT

## 2024-06-04 PROCEDURE — 84439 ASSAY OF FREE THYROXINE: CPT

## 2024-06-04 PROCEDURE — 84481 FREE ASSAY (FT-3): CPT

## 2024-06-04 PROCEDURE — 80061 LIPID PANEL: CPT

## 2024-06-04 PROCEDURE — 82570 ASSAY OF URINE CREATININE: CPT

## 2024-06-04 PROCEDURE — 84403 ASSAY OF TOTAL TESTOSTERONE: CPT

## 2024-06-04 PROCEDURE — 84443 ASSAY THYROID STIM HORMONE: CPT

## 2024-06-04 PROCEDURE — 83970 ASSAY OF PARATHORMONE: CPT

## 2024-06-04 PROCEDURE — 84270 ASSAY OF SEX HORMONE GLOBUL: CPT

## 2024-06-04 PROCEDURE — 82330 ASSAY OF CALCIUM: CPT

## 2024-06-04 PROCEDURE — 80053 COMPREHEN METABOLIC PANEL: CPT

## 2024-06-04 PROCEDURE — 84100 ASSAY OF PHOSPHORUS: CPT

## 2024-06-06 LAB — SHBG SERPL-SCNC: 50 NMOL/L (ref 19–76)

## 2025-01-13 ENCOUNTER — HOSPITAL ENCOUNTER (OUTPATIENT)
Dept: LAB | Facility: MEDICAL CENTER | Age: 60
End: 2025-01-13
Attending: INTERNAL MEDICINE
Payer: COMMERCIAL

## 2025-01-13 LAB
ALBUMIN SERPL BCP-MCNC: 4.6 G/DL (ref 3.2–4.9)
ALBUMIN/GLOB SERPL: 2.2 G/DL
ALP SERPL-CCNC: 83 U/L (ref 30–99)
ALT SERPL-CCNC: 34 U/L (ref 2–50)
ANION GAP SERPL CALC-SCNC: 13 MMOL/L (ref 7–16)
AST SERPL-CCNC: 17 U/L (ref 12–45)
BILIRUB SERPL-MCNC: 1.2 MG/DL (ref 0.1–1.5)
BUN SERPL-MCNC: 16 MG/DL (ref 8–22)
CA-I SERPL-SCNC: 1.3 MMOL/L (ref 1.1–1.3)
CALCIUM ALBUM COR SERPL-MCNC: 9.1 MG/DL (ref 8.5–10.5)
CALCIUM SERPL-MCNC: 9.6 MG/DL (ref 8.5–10.5)
CHLORIDE SERPL-SCNC: 104 MMOL/L (ref 96–112)
CHOLEST SERPL-MCNC: 102 MG/DL (ref 100–199)
CO2 SERPL-SCNC: 23 MMOL/L (ref 20–33)
CREAT SERPL-MCNC: 0.59 MG/DL (ref 0.5–1.4)
EST. AVERAGE GLUCOSE BLD GHB EST-MCNC: 166 MG/DL
GFR SERPLBLD CREATININE-BSD FMLA CKD-EPI: 112 ML/MIN/1.73 M 2
GLOBULIN SER CALC-MCNC: 2.1 G/DL (ref 1.9–3.5)
GLUCOSE SERPL-MCNC: 130 MG/DL (ref 65–99)
HBA1C MFR BLD: 7.4 % (ref 4–5.6)
HDLC SERPL-MCNC: 39 MG/DL
LDLC SERPL CALC-MCNC: 48 MG/DL
MAGNESIUM SERPL-MCNC: 2 MG/DL (ref 1.5–2.5)
PHOSPHATE SERPL-MCNC: 2.8 MG/DL (ref 2.5–4.5)
POTASSIUM SERPL-SCNC: 4.7 MMOL/L (ref 3.6–5.5)
PROT SERPL-MCNC: 6.7 G/DL (ref 6–8.2)
PTH-INTACT SERPL-MCNC: 72.6 PG/ML (ref 14–72)
SODIUM SERPL-SCNC: 140 MMOL/L (ref 135–145)
T3FREE SERPL-MCNC: 3.42 PG/ML (ref 2–4.4)
T4 FREE SERPL-MCNC: 1.72 NG/DL (ref 0.93–1.7)
TESTOST SERPL-MCNC: 367 NG/DL (ref 175–781)
TRIGL SERPL-MCNC: 73 MG/DL (ref 0–149)
TSH SERPL-ACNC: 0.49 UIU/ML (ref 0.35–5.5)

## 2025-01-13 PROCEDURE — 84403 ASSAY OF TOTAL TESTOSTERONE: CPT

## 2025-01-13 PROCEDURE — 82570 ASSAY OF URINE CREATININE: CPT

## 2025-01-13 PROCEDURE — 82330 ASSAY OF CALCIUM: CPT

## 2025-01-13 PROCEDURE — 83036 HEMOGLOBIN GLYCOSYLATED A1C: CPT

## 2025-01-13 PROCEDURE — 83735 ASSAY OF MAGNESIUM: CPT

## 2025-01-13 PROCEDURE — 83970 ASSAY OF PARATHORMONE: CPT

## 2025-01-13 PROCEDURE — 36415 COLL VENOUS BLD VENIPUNCTURE: CPT

## 2025-01-13 PROCEDURE — 84100 ASSAY OF PHOSPHORUS: CPT

## 2025-01-13 PROCEDURE — 80053 COMPREHEN METABOLIC PANEL: CPT

## 2025-01-13 PROCEDURE — 84443 ASSAY THYROID STIM HORMONE: CPT

## 2025-01-13 PROCEDURE — 84481 FREE ASSAY (FT-3): CPT

## 2025-01-13 PROCEDURE — 82043 UR ALBUMIN QUANTITATIVE: CPT

## 2025-01-13 PROCEDURE — 80061 LIPID PANEL: CPT

## 2025-01-13 PROCEDURE — 84439 ASSAY OF FREE THYROXINE: CPT

## 2025-01-13 PROCEDURE — 84270 ASSAY OF SEX HORMONE GLOBUL: CPT

## 2025-01-14 LAB
CREAT UR-MCNC: 60.16 MG/DL
MICROALBUMIN UR-MCNC: <1.2 MG/DL
MICROALBUMIN/CREAT UR: NORMAL MG/G (ref 0–30)

## 2025-01-15 LAB — SHBG SERPL-SCNC: 52 NMOL/L (ref 19–76)

## 2025-05-19 ENCOUNTER — HOSPITAL ENCOUNTER (OUTPATIENT)
Dept: LAB | Facility: MEDICAL CENTER | Age: 60
End: 2025-05-19
Attending: INTERNAL MEDICINE
Payer: COMMERCIAL

## 2025-05-19 LAB
ALBUMIN SERPL BCP-MCNC: 4.5 G/DL (ref 3.2–4.9)
ALBUMIN/GLOB SERPL: 1.8 G/DL
ALP SERPL-CCNC: 89 U/L (ref 30–99)
ALT SERPL-CCNC: 23 U/L (ref 2–50)
ANION GAP SERPL CALC-SCNC: 10 MMOL/L (ref 7–16)
AST SERPL-CCNC: 17 U/L (ref 12–45)
BILIRUB SERPL-MCNC: 1.1 MG/DL (ref 0.1–1.5)
BUN SERPL-MCNC: 18 MG/DL (ref 8–22)
CA-I SERPL-SCNC: 1.3 MMOL/L (ref 1.1–1.3)
CALCIUM ALBUM COR SERPL-MCNC: 9.5 MG/DL (ref 8.5–10.5)
CALCIUM SERPL-MCNC: 9.9 MG/DL (ref 8.5–10.5)
CHLORIDE SERPL-SCNC: 102 MMOL/L (ref 96–112)
CHOLEST SERPL-MCNC: 201 MG/DL (ref 100–199)
CO2 SERPL-SCNC: 25 MMOL/L (ref 20–33)
CREAT SERPL-MCNC: 0.64 MG/DL (ref 0.5–1.4)
EST. AVERAGE GLUCOSE BLD GHB EST-MCNC: 157 MG/DL
GFR SERPLBLD CREATININE-BSD FMLA CKD-EPI: 109 ML/MIN/1.73 M 2
GLOBULIN SER CALC-MCNC: 2.5 G/DL (ref 1.9–3.5)
GLUCOSE SERPL-MCNC: 142 MG/DL (ref 65–99)
HBA1C MFR BLD: 7.1 % (ref 4–5.6)
HDLC SERPL-MCNC: 46 MG/DL
LDLC SERPL CALC-MCNC: 124 MG/DL
MAGNESIUM SERPL-MCNC: 2.2 MG/DL (ref 1.5–2.5)
PHOSPHATE SERPL-MCNC: 2.9 MG/DL (ref 2.5–4.5)
POTASSIUM SERPL-SCNC: 4.8 MMOL/L (ref 3.6–5.5)
PROT SERPL-MCNC: 7 G/DL (ref 6–8.2)
PTH-INTACT SERPL-MCNC: 60.1 PG/ML (ref 14–72)
SODIUM SERPL-SCNC: 137 MMOL/L (ref 135–145)
T3FREE SERPL-MCNC: 3.15 PG/ML (ref 2–4.4)
T4 FREE SERPL-MCNC: 1.46 NG/DL (ref 0.93–1.7)
TESTOST SERPL-MCNC: 485 NG/DL (ref 175–781)
TRIGL SERPL-MCNC: 154 MG/DL (ref 0–149)
TSH SERPL-ACNC: 0.49 UIU/ML (ref 0.38–5.33)

## 2025-05-19 PROCEDURE — 80061 LIPID PANEL: CPT

## 2025-05-19 PROCEDURE — 84100 ASSAY OF PHOSPHORUS: CPT

## 2025-05-19 PROCEDURE — 84270 ASSAY OF SEX HORMONE GLOBUL: CPT

## 2025-05-19 PROCEDURE — 84403 ASSAY OF TOTAL TESTOSTERONE: CPT

## 2025-05-19 PROCEDURE — 83735 ASSAY OF MAGNESIUM: CPT

## 2025-05-19 PROCEDURE — 83970 ASSAY OF PARATHORMONE: CPT

## 2025-05-19 PROCEDURE — 82570 ASSAY OF URINE CREATININE: CPT

## 2025-05-19 PROCEDURE — 82043 UR ALBUMIN QUANTITATIVE: CPT

## 2025-05-19 PROCEDURE — 80053 COMPREHEN METABOLIC PANEL: CPT

## 2025-05-19 PROCEDURE — 36415 COLL VENOUS BLD VENIPUNCTURE: CPT

## 2025-05-19 PROCEDURE — 82330 ASSAY OF CALCIUM: CPT

## 2025-05-19 PROCEDURE — 83036 HEMOGLOBIN GLYCOSYLATED A1C: CPT

## 2025-05-19 PROCEDURE — 84481 FREE ASSAY (FT-3): CPT

## 2025-05-19 PROCEDURE — 84439 ASSAY OF FREE THYROXINE: CPT

## 2025-05-19 PROCEDURE — 84443 ASSAY THYROID STIM HORMONE: CPT

## 2025-05-21 LAB
COLLECT DURATION TIME SPEC: NORMAL HR
CREAT 24H UR-MCNC: 59 MG/DL
MICROALBUMIN 24H UR-MCNC: 0.4 MG/DL
MICROALBUMIN/CREAT 24H UR: 7 MG/G (ref 0–30)
SHBG SERPL-SCNC: 68 NMOL/L (ref 19–76)
SPECIMEN VOL ?TM UR: NORMAL ML